# Patient Record
Sex: MALE | Race: WHITE | NOT HISPANIC OR LATINO | Employment: UNEMPLOYED | URBAN - METROPOLITAN AREA
[De-identification: names, ages, dates, MRNs, and addresses within clinical notes are randomized per-mention and may not be internally consistent; named-entity substitution may affect disease eponyms.]

---

## 2017-02-01 ENCOUNTER — OFFICE VISIT (OUTPATIENT)
Dept: INTERNAL MEDICINE | Facility: CLINIC | Age: 47
End: 2017-02-01

## 2017-02-01 VITALS
DIASTOLIC BLOOD PRESSURE: 80 MMHG | TEMPERATURE: 97.7 F | OXYGEN SATURATION: 97 % | SYSTOLIC BLOOD PRESSURE: 118 MMHG | RESPIRATION RATE: 16 BRPM | HEART RATE: 58 BPM | WEIGHT: 126 LBS | BODY MASS INDEX: 20.97 KG/M2

## 2017-02-01 DIAGNOSIS — M19.90 ARTHRITIS: ICD-10-CM

## 2017-02-01 DIAGNOSIS — R53.83 FATIGUE, UNSPECIFIED TYPE: ICD-10-CM

## 2017-02-01 DIAGNOSIS — F41.9 ANXIETY: Primary | ICD-10-CM

## 2017-02-01 DIAGNOSIS — K21.9 GASTROESOPHAGEAL REFLUX DISEASE, ESOPHAGITIS PRESENCE NOT SPECIFIED: ICD-10-CM

## 2017-02-01 DIAGNOSIS — N40.1 BENIGN PROSTATIC HYPERPLASIA WITH LOWER URINARY TRACT SYMPTOMS, UNSPECIFIED MORPHOLOGY: ICD-10-CM

## 2017-02-01 PROBLEM — M79.673 FOOT PAIN: Status: ACTIVE | Noted: 2017-02-01

## 2017-02-01 LAB
PSA SERPL-MCNC: 1 NG/ML (ref 0–4)
T4 FREE SERPL-MCNC: 1.11 NG/DL (ref 0.89–1.76)
TSH SERPL DL<=0.05 MIU/L-ACNC: 1.9 MIU/ML (ref 0.35–5.35)

## 2017-02-01 PROCEDURE — 36415 COLL VENOUS BLD VENIPUNCTURE: CPT | Performed by: PHYSICIAN ASSISTANT

## 2017-02-01 PROCEDURE — 84443 ASSAY THYROID STIM HORMONE: CPT | Performed by: PHYSICIAN ASSISTANT

## 2017-02-01 PROCEDURE — G0103 PSA SCREENING: HCPCS | Performed by: PHYSICIAN ASSISTANT

## 2017-02-01 PROCEDURE — 99214 OFFICE O/P EST MOD 30 MIN: CPT | Performed by: PHYSICIAN ASSISTANT

## 2017-02-01 PROCEDURE — 84439 ASSAY OF FREE THYROXINE: CPT | Performed by: PHYSICIAN ASSISTANT

## 2017-02-01 RX ORDER — TAMSULOSIN HYDROCHLORIDE 0.4 MG/1
0.4 CAPSULE ORAL DAILY
Status: DISCONTINUED | OUTPATIENT
Start: 2017-02-01 | End: 2020-10-14

## 2017-02-01 RX ORDER — MELOXICAM 15 MG/1
15 TABLET ORAL DAILY
Qty: 30 TABLET | Refills: 5 | Status: ON HOLD | OUTPATIENT
Start: 2017-02-01 | End: 2020-10-14

## 2017-02-01 RX ORDER — OMEPRAZOLE 20 MG/1
20 CAPSULE, DELAYED RELEASE ORAL DAILY
Qty: 30 CAPSULE | Refills: 5 | Status: ON HOLD | OUTPATIENT
Start: 2017-02-01 | End: 2020-10-14

## 2017-02-01 RX ORDER — DICYCLOMINE HCL 20 MG
TABLET ORAL
Status: ON HOLD | COMMUNITY
Start: 2017-01-30 | End: 2020-10-14

## 2017-02-01 RX ORDER — HYDROXYZINE 50 MG/1
50 TABLET, FILM COATED ORAL 3 TIMES DAILY PRN
Qty: 90 TABLET | Refills: 2 | Status: ON HOLD | OUTPATIENT
Start: 2017-02-01 | End: 2020-10-14

## 2017-02-01 NOTE — PROGRESS NOTES
Subjective   David Dempsey is a 46 y.o. male.   Chief Complaint   Patient presents with   • Abdominal Pain     f/u   • Dizziness     History of Present Illness   PT went to [facility] for Epigastric pain, burning sensation, nausea. CT scan showed prominent 7mm common bile duct, otherwise normal scan.  He was given bentyl. This started after he wasn't on the omeprazole.  Hasn't had hydroxyzine for several weeks.      HE complains of 2 months of decreased urine flow, urine frequency, and feeling like he doesn't completely void nocturia 2-3 times nightly.    Pt is under a lot of stress, not sleeping.  Pt has a lot joint pains.  He is christiano.  GIrlfriend of 17 years has left him for his best friend.  MOther recently moved in with him, she has dementia.    The following portions of the patient's history were reviewed and updated as appropriate: allergies, current medications and problem list.    Review of Systems   Constitutional: Positive for appetite change, fatigue and unexpected weight change.   Gastrointestinal: Positive for abdominal pain (epigastric). Negative for constipation and diarrhea.   Endocrine: Positive for polyuria (x 2 months.  Nocturia 2-3 times.).   Genitourinary: Positive for frequency.   Psychiatric/Behavioral: Positive for sleep disturbance.       Objective   Physical Exam   Constitutional: He appears well-developed and well-nourished.   HENT:   Head: Normocephalic and atraumatic.   Right Ear: External ear normal.   Left Ear: External ear normal.   Eyes: Conjunctivae are normal.   Cardiovascular: Normal rate, regular rhythm and normal heart sounds.  Exam reveals no gallop and no friction rub.    No murmur heard.  Pulmonary/Chest: Effort normal and breath sounds normal.   Abdominal: Soft. Bowel sounds are normal. He exhibits no distension. There is generalized tenderness. There is no CVA tenderness. No hernia.   Genitourinary: Prostate is enlarged and tender.   Psychiatric: He has a normal mood  and affect.   Vitals reviewed.      Assessment/Plan   David was seen today for abdominal pain and dizziness.    Diagnoses and all orders for this visit:    Anxiety  -     hydrOXYzine (ATARAX) 50 MG tablet; Take 1 tablet by mouth 3 (Three) Times a Day As Needed for anxiety.    Gastroesophageal reflux disease, esophagitis presence not specified  -     omeprazole (priLOSEC) 20 MG capsule; Take 1 capsule by mouth Daily.    Benign prostatic hyperplasia with lower urinary tract symptoms, unspecified morphology  -     tamsulosin (FLOMAX) 24 hr capsule 0.4 mg; Take 1 capsule by mouth Daily.  -     PSA Screen    Arthritis  -     meloxicam (MOBIC) 15 MG tablet; Take 1 tablet by mouth Daily.    Fatigue, unspecified type  -     TSH  -     T4, Free      ED note reviewed including labs and CT scan report.

## 2020-10-11 ENCOUNTER — HOSPITAL ENCOUNTER (INPATIENT)
Facility: HOSPITAL | Age: 50
LOS: 10 days | Discharge: HOME | End: 2020-10-21
Attending: NEUROLOGICAL SURGERY | Admitting: INTERNAL MEDICINE

## 2020-10-11 ENCOUNTER — APPOINTMENT (OUTPATIENT)
Dept: CT IMAGING | Facility: HOSPITAL | Age: 50
End: 2020-10-11

## 2020-10-11 DIAGNOSIS — I60.9 SAH (SUBARACHNOID HEMORRHAGE) (HCC): ICD-10-CM

## 2020-10-11 DIAGNOSIS — I60.9 SAH (SUBARACHNOID HEMORRHAGE) (HCC): Primary | ICD-10-CM

## 2020-10-11 PROBLEM — Z86.79 HISTORY OF SUBDURAL HEMATOMA: Status: ACTIVE | Noted: 2020-10-11

## 2020-10-11 PROBLEM — I10 HTN (HYPERTENSION): Status: ACTIVE | Noted: 2020-10-11

## 2020-10-11 PROBLEM — F12.90 MARIJUANA USE: Status: ACTIVE | Noted: 2020-10-11

## 2020-10-11 PROBLEM — Z72.0 TOBACCO USE: Status: ACTIVE | Noted: 2020-10-11

## 2020-10-11 LAB
ALBUMIN SERPL-MCNC: 4.2 G/DL (ref 3.5–5.2)
ALBUMIN/GLOB SERPL: 1.5 G/DL
ALP SERPL-CCNC: 78 U/L (ref 39–117)
ALT SERPL W P-5'-P-CCNC: 13 U/L (ref 1–41)
AMPHET+METHAMPHET UR QL: NEGATIVE
AMPHETAMINES UR QL: NEGATIVE
ANION GAP SERPL CALCULATED.3IONS-SCNC: 7 MMOL/L (ref 5–15)
AST SERPL-CCNC: 21 U/L (ref 1–40)
BARBITURATES UR QL SCN: NEGATIVE
BASOPHILS # BLD AUTO: 0.02 10*3/MM3 (ref 0–0.2)
BASOPHILS NFR BLD AUTO: 0.2 % (ref 0–1.5)
BENZODIAZ UR QL SCN: NEGATIVE
BILIRUB SERPL-MCNC: 0.3 MG/DL (ref 0–1.2)
BUN SERPL-MCNC: 8 MG/DL (ref 6–20)
BUN/CREAT SERPL: 10.1 (ref 7–25)
BUPRENORPHINE SERPL-MCNC: NEGATIVE NG/ML
CALCIUM SPEC-SCNC: 8.4 MG/DL (ref 8.6–10.5)
CANNABINOIDS SERPL QL: POSITIVE
CHLORIDE SERPL-SCNC: 105 MMOL/L (ref 98–107)
CO2 SERPL-SCNC: 24 MMOL/L (ref 22–29)
COCAINE UR QL: NEGATIVE
CREAT SERPL-MCNC: 0.79 MG/DL (ref 0.76–1.27)
DEPRECATED RDW RBC AUTO: 47.8 FL (ref 37–54)
EOSINOPHIL # BLD AUTO: 0.05 10*3/MM3 (ref 0–0.4)
EOSINOPHIL NFR BLD AUTO: 0.4 % (ref 0.3–6.2)
ERYTHROCYTE [DISTWIDTH] IN BLOOD BY AUTOMATED COUNT: 13.6 % (ref 12.3–15.4)
GFR SERPL CREATININE-BSD FRML MDRD: 103 ML/MIN/1.73
GLOBULIN UR ELPH-MCNC: 2.8 GM/DL
GLUCOSE BLDC GLUCOMTR-MCNC: 116 MG/DL (ref 70–130)
GLUCOSE BLDC GLUCOMTR-MCNC: 134 MG/DL (ref 70–130)
GLUCOSE SERPL-MCNC: 133 MG/DL (ref 65–99)
HCT VFR BLD AUTO: 48 % (ref 37.5–51)
HGB BLD-MCNC: 15.5 G/DL (ref 13–17.7)
IMM GRANULOCYTES # BLD AUTO: 0.05 10*3/MM3 (ref 0–0.05)
IMM GRANULOCYTES NFR BLD AUTO: 0.4 % (ref 0–0.5)
INR PPP: 1 (ref 0.85–1.16)
LYMPHOCYTES # BLD AUTO: 0.66 10*3/MM3 (ref 0.7–3.1)
LYMPHOCYTES NFR BLD AUTO: 5.9 % (ref 19.6–45.3)
MAGNESIUM SERPL-MCNC: 1.8 MG/DL (ref 1.6–2.6)
MCH RBC QN AUTO: 30.6 PG (ref 26.6–33)
MCHC RBC AUTO-ENTMCNC: 32.3 G/DL (ref 31.5–35.7)
MCV RBC AUTO: 94.7 FL (ref 79–97)
METHADONE UR QL SCN: NEGATIVE
MONOCYTES # BLD AUTO: 0.29 10*3/MM3 (ref 0.1–0.9)
MONOCYTES NFR BLD AUTO: 2.6 % (ref 5–12)
NEUTROPHILS NFR BLD AUTO: 10.17 10*3/MM3 (ref 1.7–7)
NEUTROPHILS NFR BLD AUTO: 90.5 % (ref 42.7–76)
NRBC BLD AUTO-RTO: 0 /100 WBC (ref 0–0.2)
OPIATES UR QL: POSITIVE
OXYCODONE UR QL SCN: NEGATIVE
PCP UR QL SCN: NEGATIVE
PHOSPHATE SERPL-MCNC: 2.8 MG/DL (ref 2.5–4.5)
PLATELET # BLD AUTO: 238 10*3/MM3 (ref 140–450)
PMV BLD AUTO: 9.9 FL (ref 6–12)
POTASSIUM SERPL-SCNC: 4.4 MMOL/L (ref 3.5–5.2)
PROPOXYPH UR QL: NEGATIVE
PROT SERPL-MCNC: 7 G/DL (ref 6–8.5)
PROTHROMBIN TIME: 12.9 SECONDS (ref 11.5–14)
RBC # BLD AUTO: 5.07 10*6/MM3 (ref 4.14–5.8)
SARS-COV-2 RNA RESP QL NAA+PROBE: NOT DETECTED
SODIUM SERPL-SCNC: 136 MMOL/L (ref 136–145)
TRICYCLICS UR QL SCN: NEGATIVE
WBC # BLD AUTO: 11.24 10*3/MM3 (ref 3.4–10.8)

## 2020-10-11 PROCEDURE — 25010000003 MAGNESIUM SULFATE 4 GM/100ML SOLUTION: Performed by: INTERNAL MEDICINE

## 2020-10-11 PROCEDURE — 93010 ELECTROCARDIOGRAM REPORT: CPT | Performed by: INTERNAL MEDICINE

## 2020-10-11 PROCEDURE — 93005 ELECTROCARDIOGRAM TRACING: CPT | Performed by: INTERNAL MEDICINE

## 2020-10-11 PROCEDURE — 25010000002 HYDROMORPHONE PER 4 MG: Performed by: INTERNAL MEDICINE

## 2020-10-11 PROCEDURE — 85025 COMPLETE CBC W/AUTO DIFF WBC: CPT | Performed by: INTERNAL MEDICINE

## 2020-10-11 PROCEDURE — 87635 SARS-COV-2 COVID-19 AMP PRB: CPT | Performed by: NURSE PRACTITIONER

## 2020-10-11 PROCEDURE — 85610 PROTHROMBIN TIME: CPT | Performed by: NURSE PRACTITIONER

## 2020-10-11 PROCEDURE — 84100 ASSAY OF PHOSPHORUS: CPT | Performed by: INTERNAL MEDICINE

## 2020-10-11 PROCEDURE — 83735 ASSAY OF MAGNESIUM: CPT | Performed by: INTERNAL MEDICINE

## 2020-10-11 PROCEDURE — 80306 DRUG TEST PRSMV INSTRMNT: CPT | Performed by: NURSE PRACTITIONER

## 2020-10-11 PROCEDURE — 70498 CT ANGIOGRAPHY NECK: CPT

## 2020-10-11 PROCEDURE — 25010000002 KETOROLAC TROMETHAMINE PER 15 MG: Performed by: NURSE PRACTITIONER

## 2020-10-11 PROCEDURE — 80053 COMPREHEN METABOLIC PANEL: CPT | Performed by: INTERNAL MEDICINE

## 2020-10-11 PROCEDURE — 70496 CT ANGIOGRAPHY HEAD: CPT

## 2020-10-11 PROCEDURE — 82962 GLUCOSE BLOOD TEST: CPT

## 2020-10-11 PROCEDURE — 99291 CRITICAL CARE FIRST HOUR: CPT | Performed by: INTERNAL MEDICINE

## 2020-10-11 PROCEDURE — 0 IOPAMIDOL PER 1 ML: Performed by: NEUROLOGICAL SURGERY

## 2020-10-11 PROCEDURE — 70450 CT HEAD/BRAIN W/O DYE: CPT

## 2020-10-11 RX ORDER — HYDROMORPHONE HYDROCHLORIDE 1 MG/ML
0.5 INJECTION, SOLUTION INTRAMUSCULAR; INTRAVENOUS; SUBCUTANEOUS
Status: DISCONTINUED | OUTPATIENT
Start: 2020-10-11 | End: 2020-10-20

## 2020-10-11 RX ORDER — NICOTINE 21 MG/24HR
1 PATCH, TRANSDERMAL 24 HOURS TRANSDERMAL EVERY 24 HOURS
Status: DISCONTINUED | OUTPATIENT
Start: 2020-10-11 | End: 2020-10-11

## 2020-10-11 RX ORDER — KETOROLAC TROMETHAMINE 30 MG/ML
30 INJECTION, SOLUTION INTRAMUSCULAR; INTRAVENOUS ONCE
Status: COMPLETED | OUTPATIENT
Start: 2020-10-11 | End: 2020-10-11

## 2020-10-11 RX ORDER — FAMOTIDINE 10 MG/ML
20 INJECTION, SOLUTION INTRAVENOUS EVERY 12 HOURS SCHEDULED
Status: DISCONTINUED | OUTPATIENT
Start: 2020-10-11 | End: 2020-10-12

## 2020-10-11 RX ORDER — POTASSIUM CHLORIDE 750 MG/1
40 CAPSULE, EXTENDED RELEASE ORAL AS NEEDED
Status: DISCONTINUED | OUTPATIENT
Start: 2020-10-11 | End: 2020-10-21 | Stop reason: HOSPADM

## 2020-10-11 RX ORDER — POTASSIUM CHLORIDE 1.5 G/1.77G
40 POWDER, FOR SOLUTION ORAL AS NEEDED
Status: DISCONTINUED | OUTPATIENT
Start: 2020-10-11 | End: 2020-10-21 | Stop reason: HOSPADM

## 2020-10-11 RX ORDER — SODIUM CHLORIDE 9 MG/ML
50 INJECTION, SOLUTION INTRAVENOUS CONTINUOUS
Status: DISCONTINUED | OUTPATIENT
Start: 2020-10-11 | End: 2020-10-15

## 2020-10-11 RX ORDER — MAGNESIUM SULFATE HEPTAHYDRATE 40 MG/ML
2 INJECTION, SOLUTION INTRAVENOUS AS NEEDED
Status: DISCONTINUED | OUTPATIENT
Start: 2020-10-11 | End: 2020-10-21 | Stop reason: HOSPADM

## 2020-10-11 RX ORDER — ACETAMINOPHEN 500 MG
1000 TABLET ORAL EVERY 6 HOURS PRN
Status: DISCONTINUED | OUTPATIENT
Start: 2020-10-11 | End: 2020-10-21 | Stop reason: HOSPADM

## 2020-10-11 RX ORDER — HYDROCODONE BITARTRATE AND ACETAMINOPHEN 5; 325 MG/1; MG/1
1 TABLET ORAL EVERY 8 HOURS PRN
Status: DISCONTINUED | OUTPATIENT
Start: 2020-10-11 | End: 2020-10-11

## 2020-10-11 RX ORDER — DEXTROSE MONOHYDRATE 25 G/50ML
25 INJECTION, SOLUTION INTRAVENOUS
Status: DISCONTINUED | OUTPATIENT
Start: 2020-10-11 | End: 2020-10-21 | Stop reason: HOSPADM

## 2020-10-11 RX ORDER — SODIUM CHLORIDE 0.9 % (FLUSH) 0.9 %
10 SYRINGE (ML) INJECTION AS NEEDED
Status: DISCONTINUED | OUTPATIENT
Start: 2020-10-11 | End: 2020-10-13 | Stop reason: SDUPTHER

## 2020-10-11 RX ORDER — POTASSIUM CHLORIDE 7.45 MG/ML
10 INJECTION INTRAVENOUS
Status: DISCONTINUED | OUTPATIENT
Start: 2020-10-11 | End: 2020-10-21 | Stop reason: HOSPADM

## 2020-10-11 RX ORDER — SODIUM CHLORIDE 0.9 % (FLUSH) 0.9 %
10 SYRINGE (ML) INJECTION EVERY 12 HOURS SCHEDULED
Status: DISCONTINUED | OUTPATIENT
Start: 2020-10-11 | End: 2020-10-15

## 2020-10-11 RX ORDER — MAGNESIUM SULFATE HEPTAHYDRATE 40 MG/ML
4 INJECTION, SOLUTION INTRAVENOUS AS NEEDED
Status: DISCONTINUED | OUTPATIENT
Start: 2020-10-11 | End: 2020-10-21 | Stop reason: HOSPADM

## 2020-10-11 RX ORDER — NICOTINE POLACRILEX 4 MG
15 LOZENGE BUCCAL
Status: DISCONTINUED | OUTPATIENT
Start: 2020-10-11 | End: 2020-10-21 | Stop reason: HOSPADM

## 2020-10-11 RX ORDER — AMOXICILLIN 250 MG
2 CAPSULE ORAL 2 TIMES DAILY
Status: DISCONTINUED | OUTPATIENT
Start: 2020-10-11 | End: 2020-10-17

## 2020-10-11 RX ORDER — HYDROCODONE BITARTRATE AND ACETAMINOPHEN 5; 325 MG/1; MG/1
1 TABLET ORAL EVERY 6 HOURS PRN
Status: DISCONTINUED | OUTPATIENT
Start: 2020-10-11 | End: 2020-10-20

## 2020-10-11 RX ORDER — TAMSULOSIN HYDROCHLORIDE 0.4 MG/1
0.4 CAPSULE ORAL DAILY
Status: DISCONTINUED | OUTPATIENT
Start: 2020-10-11 | End: 2020-10-21 | Stop reason: HOSPADM

## 2020-10-11 RX ORDER — NIMODIPINE 30 MG/1
60 CAPSULE, LIQUID FILLED ORAL
Status: DISCONTINUED | OUTPATIENT
Start: 2020-10-11 | End: 2020-10-21 | Stop reason: HOSPADM

## 2020-10-11 RX ADMIN — MAGNESIUM SULFATE HEPTAHYDRATE 4 G: 40 INJECTION, SOLUTION INTRAVENOUS at 20:49

## 2020-10-11 RX ADMIN — NICARDIPINE HYDROCHLORIDE 5 MG/HR: 0.1 INJECTION, SOLUTION INTRAVENOUS at 20:48

## 2020-10-11 RX ADMIN — HYDROCODONE BITARTRATE AND ACETAMINOPHEN 1 TABLET: 5; 325 TABLET ORAL at 20:18

## 2020-10-11 RX ADMIN — NICARDIPINE HYDROCHLORIDE 5 MG/HR: 0.1 INJECTION, SOLUTION INTRAVENOUS at 16:44

## 2020-10-11 RX ADMIN — HYDROMORPHONE HYDROCHLORIDE 0.5 MG: 1 INJECTION, SOLUTION INTRAMUSCULAR; INTRAVENOUS; SUBCUTANEOUS at 21:04

## 2020-10-11 RX ADMIN — HYDROMORPHONE HYDROCHLORIDE 0.5 MG: 1 INJECTION, SOLUTION INTRAMUSCULAR; INTRAVENOUS; SUBCUTANEOUS at 23:20

## 2020-10-11 RX ADMIN — KETOROLAC TROMETHAMINE 30 MG: 30 INJECTION, SOLUTION INTRAMUSCULAR at 16:44

## 2020-10-11 RX ADMIN — SODIUM CHLORIDE 100 ML/HR: 9 INJECTION, SOLUTION INTRAVENOUS at 18:23

## 2020-10-11 RX ADMIN — FAMOTIDINE 20 MG: 10 INJECTION INTRAVENOUS at 20:17

## 2020-10-11 RX ADMIN — IOPAMIDOL 75 ML: 755 INJECTION, SOLUTION INTRAVENOUS at 14:35

## 2020-10-11 RX ADMIN — NIMODIPINE 60 MG: 30 CAPSULE, LIQUID FILLED ORAL at 20:17

## 2020-10-11 RX ADMIN — TAMSULOSIN HYDROCHLORIDE 0.4 MG: 0.4 CAPSULE ORAL at 18:22

## 2020-10-11 RX ADMIN — NIMODIPINE 60 MG: 30 CAPSULE, LIQUID FILLED ORAL at 18:22

## 2020-10-11 RX ADMIN — SENNOSIDES AND DOCUSATE SODIUM 2 TABLET: 8.6; 5 TABLET ORAL at 20:21

## 2020-10-11 NOTE — ACP (ADVANCE CARE PLANNING)
I visited with patient regarding referral for an Advanced Care Plan. Patient's SO stated he had one at home. It was completed a year ago.

## 2020-10-11 NOTE — PROGRESS NOTES
Received phone call requesting transfer from [facility] ED to [facility] for management of subarachnoid hemorrhage likely secondary to aneurysm rupture.  Spoke with ACC and house supervisor to facilitate this transfer.  Patient arrived at 1424 and taken immediately to CT scanner.  CT head without contrast and CTA head neck were obtained.  Dr. Iraheta reviewed all imaging and gave the following orders:   -Admit to neuro ICU  -Hemorrhagic stroke order set  -Strict blood pressure management SBP<140, Cardene drip order placed to achieve this  -Plan for angio in a.m.--order to obtain sent and n.p.o. after midnight placed  -Medication orders placed for headache per Dr. Iraheta  -Please page neurosurgery with any neurologic changes  -[employer] is a 2 for moderate to severe headache

## 2020-10-11 NOTE — NURSING NOTE
ACC REVIEW REPORT: [facility]        PATIENT NAME: David Dempsey    PATIENT ID: [id number]      COVID-19 ACC SCREENING       DOES THE PATIENT HAVE A FEVER GREATER THAN OR EQUAL .4: NO    IS THE PATIENT EXPERIENCING SHORTNESS OF BREATH: NO    DOES THE PATIENT HAVE A COUGH: NO    DOES THE PATIENT HAVE ANY OF THE FOLLOWING RISK FACTORS:    EXPOSURE TO SUSPECTED OR KNOWN COVID-19: NO    RECENT TRAVEL HISTORY TO ENDEMIC AREA (DOMESTIC/LOCAL): NO    IS THE PATIENT A HEALTHCARE WORKER: NO    HAS THE PATIENT BEEN TESTED FOR COVID-19: NO    DATE TESTED: NOT TESTED    LAB TESTING SENT TO: NOT TESTED      BED: N239    BED TYPE: ICU    BED GIVEN TO:     TIME BED GIVEN: 1410    YOB: 1970    AGE: 51YO    GENDER: M    PREVIOUS ADMIT TO [facility]:     PREVIOUS ADMISSION DATE:     PATIENT CLASS: INPATIENT    TODAY'S DATE: 10/11/2020    TRANSFER DATE: 10/11/2020    ETA: 1415    TRANSFERRING FACILITY: [facility]    TRANSFERRING FACILITY PHONE # : [phone]    TRANSFERRING MD:     ACCEPTING PROVIDER: MD RIVERA    NEUROLOGY PHYSICIAN: MIGUEL    DATE/TIME REQUEST RECEIVED: 10/11 1350    [facility] RN: SAULO THOMAS    REPORT FROM: SAULO HARO    TIME REPORT TAKEN: 1400    DIAGNOSIS: STROKE    REASON FOR TRANSFER TO [facility]: HIGHER LEVEL OF CARE    TRANSPORTATION: AMBULANCE    CLINICAL REASON FOR TRANSFER TO [facility]: PATIENT ARRIVED TO ED WITH COMPLAINTS OF NAUSEA, VOMITING, INTENSE HEADACHE AT 1000.  BP WAS IN THE 170S ON ARRIVAL, IMAGING SHOWED FINDINGS CONSISTENT WITH SUBARACHNOID HEMORRHAGE, PATIENT TRANSFERRING TO RECEIVE INTERVENTIONS.      CLINICAL INFORMATION    HEIGHT: 5'5    WEIGHT: 132LBS    ALLERGIES: PENICILLIN    YO: NONE    INFECTIOUS DISEASE: NONE    ISOLATION: NONE    LAST VITAL SIGNS:  TIME: 1400  TEMP: 97.9F  PULSE: 60  B/P: 139/81  RESP: 19    LAB INFORMATION: WBC 16.5, HGB 15.3, HCT 46.2, BLOOD GLUCOSE 119, , K 3.7, CREATININE 1.1, BUN 11    CULTURE INFORMATION: NONE    MEDS/IV FLUIDS: 18' LEFT  AC, 18' LEFT HAND  CARDENE DRIP AT 5mg/hr FOR BP CONTROL (MOST LIKELY WILL RUN OUT BEFORE ARRIVING TO [facility])      CARDIAC SYSTEM:    CHEST PAIN: NONE    RATE: NONE    SCALE: NONE    RHYTHM: SINUS BRADYCARDIA TO NORMAL SINUS RHYTHM    Is patient taking or has patient been given any drugs that could increase bleeding? DENIES  (Plavix, Brilinta, Effient, Eliquis, Xarelto, Warfarin, Integrilin, Angiomax)    DRUG:      DOSE/FREQUENCY:     CARDIAC NOTES: EKG SHOWED SINUS BRADYCARDIA ON ADMISSION, CURRENTLY NORMAL SINUS RHYTHM.       RESPIRATORY SYSTEM:    LUNG SOUNDS: PRESENT    CLEAR: YES    OXYGEN: 93%    O2 SAT: ROOM AIR    RESPIRATORY STATUS:       CNS/MUSCULOSKELETAL      ARCENIO COMA SCALE: PERRUDDY    E: 4  M: 6  V: 5    LAST KNOWN WELL: 10/11 1400          NIHSS    Survey Item  0: Means Alert  1: Drowsy or Answer Correctly  2: Incorrect, Forced, Can't Resist Gravity  3: Complete or No Effort  4: No Movement  NT: Not Testable Acceptable As Noted Above      1A: Level of Consciousness: 0    1B: LOC Questions (month, age) : 0    1C: LOC Commands (open/close eyes, make a fist & let go): 0    2:  Best Gaze (eyes open-pt follows examiner's fingers or face): 0    3:  Visual (introduce visual stimulus/threat to pt's visual field quad. Cover 1 eye and hold up fingers in all 4 quadrants) : 0    4.  Facial Palsy (show teeth, raise eyebrows and squeeze eyes tightly shut): 0    5A: Motor Arm-Left (elevate extremity to 90 degrees and score drift/movement.  Count to 10 aloud and use fingers for visual cue): 0    5B:  Motor Arm-Right (elevate extremity to 90 degrees and score drift/movement.  Count to 10 aloud and use fingers for visual cue): 0    6A:  Motor Leg-Left (elevate extremity to 30 degrees and score drift/movement.  Count to 5 out loud and use fingers for visual cue): 0    6B:  Motor Leg-Right (elevate extremity to 30 degrees and score drift/movement.  Count to 5 out loud and use fingers for visual cue): 0    7:  Limb  Ataxia- finger to nose, heel down shin: 0    8:  Sensory- pin prick to face, arms, trunk, and legs. Compare sharpness side to side: 0    9:  Best Language- name, items, describe picture, and read sentences.  Do not forget glasses if they normally wear them: 0    10: Dysarthria- elevate speech clarity by pt reading or repeating words on a list: 0    11: Extinction and Inattention- Use information from prior testing or double simultaneous stimuli testing to identify neglect. Face, arms, legs and visual field: 0    Total NIHSS Score: 0  Date: 10/11  Time of NIHSS Assessment:         SIZE OF HEMORRHAGE: LARGE AMOUNT OF FLUID CONSISTENT WITH SUBARACHNOID HEMORRHAGE, CONSISTENT WITH RUPTURE OF CEREBRAL ANEURYSM    CAT SCAN RESULTS:     MRI RESULTS:     CNS/MUSCULOSKELETAL NOTES: PATIENT WITH HISTORY OF CEREBRAL ANEURYSM, GIVEN 4MG OF MORPHINE TWICE FOR PAIN RELATED TO HEADACHE.      GI//GY      ABDOMINAL PAIN: NONE    VOMITING: NOT CURRENTLY     DIARRHEA: NONE    NAUSEA: NOT CURRENTLY    BOWEL SOUNDS: PRESENT    OCCULT STOOL:     TESTICULAR PAIN:     HEMATURIA:     GI//GY NOTES: PT HAS HAD NO URINE OUTPUT SINCE PRESENTING TO [facility] ED.     PAST MEDICAL HISTORY: GERD, ARTHRITIS, SMOKING    OTHER SYMPTOM NOTES:     ADDITIONAL NOTES:           Jasmina Webber RN  10/11/2020  14:12 EDT
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
## 2020-10-12 ENCOUNTER — APPOINTMENT (OUTPATIENT)
Dept: CARDIOLOGY | Facility: HOSPITAL | Age: 50
End: 2020-10-12

## 2020-10-12 ENCOUNTER — APPOINTMENT (OUTPATIENT)
Dept: GENERAL RADIOLOGY | Facility: HOSPITAL | Age: 50
End: 2020-10-12

## 2020-10-12 LAB
ALBUMIN SERPL-MCNC: 3.6 G/DL (ref 3.5–5.2)
ALBUMIN/GLOB SERPL: 1.4 G/DL
ALP SERPL-CCNC: 74 U/L (ref 39–117)
ALT SERPL W P-5'-P-CCNC: 10 U/L (ref 1–41)
ANION GAP SERPL CALCULATED.3IONS-SCNC: 9 MMOL/L (ref 5–15)
AST SERPL-CCNC: 17 U/L (ref 1–40)
BASOPHILS # BLD AUTO: 0.03 10*3/MM3 (ref 0–0.2)
BASOPHILS NFR BLD AUTO: 0.3 % (ref 0–1.5)
BILIRUB SERPL-MCNC: 0.4 MG/DL (ref 0–1.2)
BUN SERPL-MCNC: 9 MG/DL (ref 6–20)
BUN/CREAT SERPL: 11 (ref 7–25)
CALCIUM SPEC-SCNC: 8 MG/DL (ref 8.6–10.5)
CHLORIDE SERPL-SCNC: 104 MMOL/L (ref 98–107)
CHOLEST SERPL-MCNC: 148 MG/DL (ref 0–200)
CO2 SERPL-SCNC: 23 MMOL/L (ref 22–29)
CREAT SERPL-MCNC: 0.82 MG/DL (ref 0.76–1.27)
DEPRECATED RDW RBC AUTO: 49.2 FL (ref 37–54)
EOSINOPHIL # BLD AUTO: 0.5 10*3/MM3 (ref 0–0.4)
EOSINOPHIL NFR BLD AUTO: 4.6 % (ref 0.3–6.2)
ERYTHROCYTE [DISTWIDTH] IN BLOOD BY AUTOMATED COUNT: 13.8 % (ref 12.3–15.4)
GFR SERPL CREATININE-BSD FRML MDRD: 99 ML/MIN/1.73
GLOBULIN UR ELPH-MCNC: 2.5 GM/DL
GLUCOSE BLDC GLUCOMTR-MCNC: 112 MG/DL (ref 70–130)
GLUCOSE BLDC GLUCOMTR-MCNC: 118 MG/DL (ref 70–130)
GLUCOSE SERPL-MCNC: 116 MG/DL (ref 65–99)
HBA1C MFR BLD: 5.5 % (ref 4.8–5.6)
HCT VFR BLD AUTO: 44.3 % (ref 37.5–51)
HDLC SERPL-MCNC: 36 MG/DL (ref 40–60)
HGB BLD-MCNC: 14.4 G/DL (ref 13–17.7)
IMM GRANULOCYTES # BLD AUTO: 0.06 10*3/MM3 (ref 0–0.05)
IMM GRANULOCYTES NFR BLD AUTO: 0.6 % (ref 0–0.5)
LDLC SERPL CALC-MCNC: 98 MG/DL (ref 0–100)
LDLC/HDLC SERPL: 2.71 {RATIO}
LYMPHOCYTES # BLD AUTO: 1.33 10*3/MM3 (ref 0.7–3.1)
LYMPHOCYTES NFR BLD AUTO: 12.2 % (ref 19.6–45.3)
MAGNESIUM SERPL-MCNC: 2.7 MG/DL (ref 1.6–2.6)
MCH RBC QN AUTO: 31.5 PG (ref 26.6–33)
MCHC RBC AUTO-ENTMCNC: 32.5 G/DL (ref 31.5–35.7)
MCV RBC AUTO: 96.9 FL (ref 79–97)
MONOCYTES # BLD AUTO: 0.8 10*3/MM3 (ref 0.1–0.9)
MONOCYTES NFR BLD AUTO: 7.3 % (ref 5–12)
NEUTROPHILS NFR BLD AUTO: 75 % (ref 42.7–76)
NEUTROPHILS NFR BLD AUTO: 8.17 10*3/MM3 (ref 1.7–7)
NRBC BLD AUTO-RTO: 0 /100 WBC (ref 0–0.2)
PHOSPHATE SERPL-MCNC: 2.6 MG/DL (ref 2.5–4.5)
PLATELET # BLD AUTO: 233 10*3/MM3 (ref 140–450)
PMV BLD AUTO: 9.7 FL (ref 6–12)
POTASSIUM SERPL-SCNC: 3.9 MMOL/L (ref 3.5–5.2)
PROT SERPL-MCNC: 6.1 G/DL (ref 6–8.5)
RBC # BLD AUTO: 4.57 10*6/MM3 (ref 4.14–5.8)
SODIUM SERPL-SCNC: 136 MMOL/L (ref 136–145)
TRIGL SERPL-MCNC: 72 MG/DL (ref 0–150)
VLDLC SERPL-MCNC: 14 MG/DL (ref 5–40)
WBC # BLD AUTO: 10.89 10*3/MM3 (ref 3.4–10.8)

## 2020-10-12 PROCEDURE — 36227 PLACE CATH XTRNL CAROTID: CPT | Performed by: RADIOLOGY

## 2020-10-12 PROCEDURE — 82962 GLUCOSE BLOOD TEST: CPT

## 2020-10-12 PROCEDURE — 99221 1ST HOSP IP/OBS SF/LOW 40: CPT | Performed by: PHYSICIAN ASSISTANT

## 2020-10-12 PROCEDURE — 25010000002 MIDAZOLAM PER 1 MG: Performed by: RADIOLOGY

## 2020-10-12 PROCEDURE — 36226 PLACE CATH VERTEBRAL ART: CPT | Performed by: RADIOLOGY

## 2020-10-12 PROCEDURE — B31CYZZ FLUOROSCOPY OF BILATERAL EXTERNAL CAROTID ARTERIES USING OTHER CONTRAST: ICD-10-PCS | Performed by: RADIOLOGY

## 2020-10-12 PROCEDURE — 25010000002 HYDROMORPHONE PER 4 MG: Performed by: PHYSICIAN ASSISTANT

## 2020-10-12 PROCEDURE — 25010000002 ONDANSETRON PER 1 MG: Performed by: PHYSICIAN ASSISTANT

## 2020-10-12 PROCEDURE — B31RYZZ FLUOROSCOPY OF INTRACRANIAL ARTERIES USING OTHER CONTRAST: ICD-10-PCS | Performed by: RADIOLOGY

## 2020-10-12 PROCEDURE — 25010000002 ONDANSETRON PER 1 MG: Performed by: NURSE PRACTITIONER

## 2020-10-12 PROCEDURE — 36224 PLACE CATH CAROTD ART: CPT | Performed by: RADIOLOGY

## 2020-10-12 PROCEDURE — 85025 COMPLETE CBC W/AUTO DIFF WBC: CPT | Performed by: INTERNAL MEDICINE

## 2020-10-12 PROCEDURE — 80053 COMPREHEN METABOLIC PANEL: CPT | Performed by: INTERNAL MEDICINE

## 2020-10-12 PROCEDURE — 92523 SPEECH SOUND LANG COMPREHEN: CPT | Performed by: SPEECH-LANGUAGE PATHOLOGIST

## 2020-10-12 PROCEDURE — B31GYZZ FLUOROSCOPY OF BILATERAL VERTEBRAL ARTERIES USING OTHER CONTRAST: ICD-10-PCS | Performed by: RADIOLOGY

## 2020-10-12 PROCEDURE — 93886 INTRACRANIAL COMPLETE STUDY: CPT

## 2020-10-12 PROCEDURE — 83735 ASSAY OF MAGNESIUM: CPT | Performed by: INTERNAL MEDICINE

## 2020-10-12 PROCEDURE — 25010000002 HYDROMORPHONE PER 4 MG: Performed by: INTERNAL MEDICINE

## 2020-10-12 PROCEDURE — 99232 SBSQ HOSP IP/OBS MODERATE 35: CPT | Performed by: INTERNAL MEDICINE

## 2020-10-12 PROCEDURE — 25010000002 FENTANYL CITRATE (PF) 100 MCG/2ML SOLUTION: Performed by: RADIOLOGY

## 2020-10-12 PROCEDURE — C1769 GUIDE WIRE: HCPCS | Performed by: RADIOLOGY

## 2020-10-12 PROCEDURE — 25010000002 PROMETHAZINE PER 50 MG: Performed by: PHYSICIAN ASSISTANT

## 2020-10-12 PROCEDURE — C1894 INTRO/SHEATH, NON-LASER: HCPCS | Performed by: RADIOLOGY

## 2020-10-12 PROCEDURE — C1760 CLOSURE DEV, VASC: HCPCS | Performed by: RADIOLOGY

## 2020-10-12 PROCEDURE — 83036 HEMOGLOBIN GLYCOSYLATED A1C: CPT | Performed by: INTERNAL MEDICINE

## 2020-10-12 PROCEDURE — 71045 X-RAY EXAM CHEST 1 VIEW: CPT

## 2020-10-12 PROCEDURE — 0 IODIXANOL PER 1 ML: Performed by: RADIOLOGY

## 2020-10-12 PROCEDURE — 25010000002 PROMETHAZINE PER 50 MG: Performed by: NEUROLOGICAL SURGERY

## 2020-10-12 PROCEDURE — 80061 LIPID PANEL: CPT | Performed by: NURSE PRACTITIONER

## 2020-10-12 PROCEDURE — 84100 ASSAY OF PHOSPHORUS: CPT | Performed by: INTERNAL MEDICINE

## 2020-10-12 RX ORDER — IODIXANOL 320 MG/ML
INJECTION, SOLUTION INTRAVASCULAR AS NEEDED
Status: DISCONTINUED | OUTPATIENT
Start: 2020-10-12 | End: 2020-10-12 | Stop reason: HOSPADM

## 2020-10-12 RX ORDER — MIDAZOLAM HYDROCHLORIDE 1 MG/ML
INJECTION INTRAMUSCULAR; INTRAVENOUS AS NEEDED
Status: DISCONTINUED | OUTPATIENT
Start: 2020-10-12 | End: 2020-10-12 | Stop reason: HOSPADM

## 2020-10-12 RX ORDER — SODIUM CHLORIDE 9 MG/ML
INJECTION, SOLUTION INTRAVENOUS CONTINUOUS PRN
Status: COMPLETED | OUTPATIENT
Start: 2020-10-12 | End: 2020-10-12

## 2020-10-12 RX ORDER — SODIUM CHLORIDE 0.9 % (FLUSH) 0.9 %
10 SYRINGE (ML) INJECTION AS NEEDED
Status: DISCONTINUED | OUTPATIENT
Start: 2020-10-12 | End: 2020-10-15

## 2020-10-12 RX ORDER — ONDANSETRON 2 MG/ML
4 INJECTION INTRAMUSCULAR; INTRAVENOUS EVERY 6 HOURS PRN
Status: DISCONTINUED | OUTPATIENT
Start: 2020-10-12 | End: 2020-10-21 | Stop reason: HOSPADM

## 2020-10-12 RX ORDER — SODIUM CHLORIDE 9 MG/ML
75 INJECTION, SOLUTION INTRAVENOUS CONTINUOUS
Status: ACTIVE | OUTPATIENT
Start: 2020-10-12 | End: 2020-10-12

## 2020-10-12 RX ORDER — FENTANYL CITRATE 50 UG/ML
INJECTION, SOLUTION INTRAMUSCULAR; INTRAVENOUS AS NEEDED
Status: DISCONTINUED | OUTPATIENT
Start: 2020-10-12 | End: 2020-10-12 | Stop reason: HOSPADM

## 2020-10-12 RX ORDER — LIDOCAINE HYDROCHLORIDE 10 MG/ML
INJECTION, SOLUTION EPIDURAL; INFILTRATION; INTRACAUDAL; PERINEURAL AS NEEDED
Status: DISCONTINUED | OUTPATIENT
Start: 2020-10-12 | End: 2020-10-12 | Stop reason: HOSPADM

## 2020-10-12 RX ORDER — SODIUM CHLORIDE 0.9 % (FLUSH) 0.9 %
3 SYRINGE (ML) INJECTION EVERY 12 HOURS SCHEDULED
Status: DISCONTINUED | OUTPATIENT
Start: 2020-10-12 | End: 2020-10-13

## 2020-10-12 RX ORDER — FAMOTIDINE 20 MG/1
20 TABLET, FILM COATED ORAL EVERY 12 HOURS SCHEDULED
Status: DISCONTINUED | OUTPATIENT
Start: 2020-10-12 | End: 2020-10-21 | Stop reason: HOSPADM

## 2020-10-12 RX ADMIN — HYDROMORPHONE HYDROCHLORIDE 0.5 MG: 1 INJECTION, SOLUTION INTRAMUSCULAR; INTRAVENOUS; SUBCUTANEOUS at 15:57

## 2020-10-12 RX ADMIN — HYDROMORPHONE HYDROCHLORIDE 0.5 MG: 1 INJECTION, SOLUTION INTRAMUSCULAR; INTRAVENOUS; SUBCUTANEOUS at 23:08

## 2020-10-12 RX ADMIN — FAMOTIDINE 20 MG: 10 INJECTION INTRAVENOUS at 08:00

## 2020-10-12 RX ADMIN — SODIUM CHLORIDE 100 ML/HR: 9 INJECTION, SOLUTION INTRAVENOUS at 15:57

## 2020-10-12 RX ADMIN — HYDROMORPHONE HYDROCHLORIDE 0.5 MG: 1 INJECTION, SOLUTION INTRAMUSCULAR; INTRAVENOUS; SUBCUTANEOUS at 08:11

## 2020-10-12 RX ADMIN — HYDROMORPHONE HYDROCHLORIDE 0.5 MG: 1 INJECTION, SOLUTION INTRAMUSCULAR; INTRAVENOUS; SUBCUTANEOUS at 02:51

## 2020-10-12 RX ADMIN — FAMOTIDINE 20 MG: 20 TABLET ORAL at 20:08

## 2020-10-12 RX ADMIN — NIMODIPINE 60 MG: 30 CAPSULE, LIQUID FILLED ORAL at 15:57

## 2020-10-12 RX ADMIN — NIMODIPINE 60 MG: 30 CAPSULE, LIQUID FILLED ORAL at 00:23

## 2020-10-12 RX ADMIN — SODIUM CHLORIDE, PRESERVATIVE FREE 10 ML: 5 INJECTION INTRAVENOUS at 20:10

## 2020-10-12 RX ADMIN — SENNOSIDES AND DOCUSATE SODIUM 2 TABLET: 8.6; 5 TABLET ORAL at 20:08

## 2020-10-12 RX ADMIN — NIMODIPINE 60 MG: 30 CAPSULE, LIQUID FILLED ORAL at 04:51

## 2020-10-12 RX ADMIN — NIMODIPINE 60 MG: 30 CAPSULE, LIQUID FILLED ORAL at 13:26

## 2020-10-12 RX ADMIN — NIMODIPINE 60 MG: 30 CAPSULE, LIQUID FILLED ORAL at 20:08

## 2020-10-12 RX ADMIN — NIMODIPINE 60 MG: 30 CAPSULE, LIQUID FILLED ORAL at 08:00

## 2020-10-12 RX ADMIN — PROMETHAZINE HYDROCHLORIDE 12.5 MG: 25 INJECTION, SOLUTION INTRAMUSCULAR; INTRAVENOUS at 08:37

## 2020-10-12 RX ADMIN — HYDROCODONE BITARTRATE AND ACETAMINOPHEN 1 TABLET: 5; 325 TABLET ORAL at 20:08

## 2020-10-12 RX ADMIN — SENNOSIDES AND DOCUSATE SODIUM 2 TABLET: 8.6; 5 TABLET ORAL at 08:00

## 2020-10-12 RX ADMIN — PROMETHAZINE HYDROCHLORIDE 12.5 MG: 25 INJECTION, SOLUTION INTRAMUSCULAR; INTRAVENOUS at 20:38

## 2020-10-12 RX ADMIN — TAMSULOSIN HYDROCHLORIDE 0.4 MG: 0.4 CAPSULE ORAL at 08:00

## 2020-10-12 RX ADMIN — ONDANSETRON HYDROCHLORIDE 4 MG: 2 SOLUTION INTRAMUSCULAR; INTRAVENOUS at 04:38

## 2020-10-12 RX ADMIN — ONDANSETRON HYDROCHLORIDE 4 MG: 2 SOLUTION INTRAMUSCULAR; INTRAVENOUS at 23:08

## 2020-10-12 RX ADMIN — NICARDIPINE HYDROCHLORIDE 2.5 MG/HR: 0.1 INJECTION, SOLUTION INTRAVENOUS at 00:23

## 2020-10-12 RX ADMIN — ONDANSETRON HYDROCHLORIDE 4 MG: 2 SOLUTION INTRAMUSCULAR; INTRAVENOUS at 15:57

## 2020-10-12 RX ADMIN — SODIUM CHLORIDE 100 ML/HR: 9 INJECTION, SOLUTION INTRAVENOUS at 04:38
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
## 2020-10-12 NOTE — BRIEF OP NOTE
"CEREBRAL ANGIOGRAM IR  Progress Note    David Dempsey  10/12/2020    Pre-op Diagnosis:      * SAH (subarachnoid hemorrhage) (CMS/[facility]) [I60.9]       Post-Op Diagnosis Codes:     * SAH (subarachnoid hemorrhage) (CMS/[facility]) [I60.9]    Procedure/CPT® Codes:        Procedure(s):  Cerebral angiogram    Surgeon(s):  Francisco Vasquez MD    Anesthesia: * No anesthesia type entered *    Staff:   Circulator: Aster Mackenzie RN  Scrub Person: Giancarlo Acevedo  Documenter: Zuleyka Quezada RN         Estimated Blood Loss: minimal    Urine Voided: * No values recorded between 10/12/2020  9:35 AM and 10/12/2020 10:27 AM *    Specimens:                None          Drains: * No LDAs found *    Findings: Four-vessel cerebral angiogram is unremarkable.  Specifically, there is no aneurysm, vascular malformation, dural fistula, changes of vasculitis/vasospasm, or other \"culprit\" lesion to account for the patient's subarachnoid hemorrhage.  Incidentally noted is a tiny \"infundibulum\" associated with the origin of the right posterior communicating artery, generally viewed as an incidental finding and variant of normal.    Complications: None apparent.          Francisco Vasquez MD     Date: 10/12/2020  Time: 10:34 EDT      "

## 2020-10-12 NOTE — PROGRESS NOTES
"Clinical Nutrition Note      Patient Name: David Depmsey  MRN: [id number]  Admission date: 10/11/2020      Multidisciplinary Rounds    Additional information obtained during MDR:  RN reports pt came to ED for severe HA , has a hx of SDH and crani; he was found to have SAH and transferred here. He is NPO for angiogram this morning.    Current diet: Diet Regular    Oral Nutrition Supplement:     Pertinent medical data reviewed:  No nutrition risk identified on nursing screen; MST score \"0\"    Intervention:  Menu provided  Plan of care and goals reviewed    Monitor:  RD to follow per protocol      Denae Sheikh MS,RD,LD  10/12/20 11:42 EDT  Time: 15  mins       "
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
## 2020-10-12 NOTE — PLAN OF CARE
Problem: Adult Inpatient Plan of Care  Goal: Plan of Care Review  Outcome: Ongoing, Progressing  Flowsheets (Taken 10/12/2020 1145)  Plan of Care Reviewed With: patient   SLP evaluation completed. Will sign-off for s/l/cognitive deficits. Please see note for further details and recommendations.

## 2020-10-12 NOTE — PROGRESS NOTES
"INTENSIVIST NOTE     Hospital Day: 1    Mr. David Dempsey, 50 y.o. male is followed for:   Subarachnoid hemorrhage, hypertension, BPH, and GERD       SUBJECTIVE     50-year-old male who developed the sudden onset of a severe headache with nausea and photophobia on 10/11.  He was transferred to our hospital after a CT scan demonstrated a subarachnoid hemorrhage.  He underwent diagnostic angiogram on 10/12 which revealed no culprit lesion for his bleed.  He has a history of a prior left frontal craniotomy in July 2019 for subdural hematoma.  Other medical problems are hypertension, BPH, GERD, and tobacco use.    Interval history:    Status post diagnostic angiogram today which revealed no culprit lesion for his subarachnoid hemorrhage.  He is afebrile.  Fluid balance +1.9 L.  Awake and alert.    The patient's relevant past medical, surgical and social history were reviewed and updated in Epic as appropriate.       OBJECTIVE     Vital Sign Min/Max for last 24 hours  Temp  Min: 97.7 °F (36.5 °C)  Max: 98.5 °F (36.9 °C)   BP  Min: 104/66  Max: 148/78   Pulse  Min: 62  Max: 128   Resp  Min: 14  Max: 16   SpO2  Min: 92 %  Max: 99 %   No data recorded   Weight  Min: 54.3 kg (119 lb 11.4 oz)  Max: 54.3 kg (119 lb 11.4 oz)      Intake/Output Summary (Last 24 hours) at 10/12/2020 1726  Last data filed at 10/12/2020 1600  Gross per 24 hour   Intake 3341.1 ml   Output 1400 ml   Net 1941.1 ml      Flowsheet Rows      First Filed Value   Admission Height  165.1 cm (65\") Documented at 10/11/2020 2029   Admission Weight  54.3 kg (119 lb 11.4 oz) Documented at 10/11/2020 2029             10/11/20  2029 10/12/20  0500 10/12/20  1451   Weight: 54.3 kg (119 lb 11.4 oz) 54.3 kg (119 lb 11.4 oz) 54.3 kg (119 lb 11.4 oz)            Objective:  General Appearance:  In no acute distress.    Vital signs: (most recent): Blood pressure 126/74, pulse 68, temperature 98.4 °F (36.9 °C), temperature source Oral, resp. rate 14, height 165.1 cm (65\"), " weight 54.3 kg (119 lb 11.4 oz), SpO2 95 %.    HEENT: Normal HEENT exam.    Lungs:  Normal effort and normal respiratory rate.  Breath sounds clear to auscultation.  He is not in respiratory distress.  No rales, wheezes or rhonchi.    Heart: Normal rate.  Regular rhythm.  S1 normal and S2 normal.  No murmur, gallop or friction rub.   Chest: Symmetric chest wall expansion.   Abdomen: Abdomen is soft and non-distended.  Bowel sounds are normal.   There is no abdominal tenderness.   There is no mass. There is no splenomegaly. There is no hepatomegaly.   Extremities: There is no deformity or dependent edema.  (Right femoral catheterization site without bleeding or hematoma)  Neurological: Patient is alert and oriented to person, place and time.    Pupils:  Pupils are equal, round, and reactive to light.    Skin:  Warm and dry.              I reviewed the patient's new clinical results.  I reviewed the patient's new imaging results/reports including actual images and agree with reports.      Chest X-Ray: NAD    INFUSIONS  niCARdipine, 5-15 mg/hr, Last Rate: Stopped (10/12/20 0130)  sodium chloride, 100 mL/hr, Last Rate: 100 mL/hr (10/12/20 1557)        Results from last 7 days   Lab Units 10/12/20  0424 10/11/20  1717   WBC 10*3/mm3 10.89* 11.24*   HEMOGLOBIN g/dL 14.4 15.5   HEMATOCRIT % 44.3 48.0   PLATELETS 10*3/mm3 233 238     Results from last 7 days   Lab Units 10/12/20  0424 10/11/20  1717   SODIUM mmol/L 136 136   POTASSIUM mmol/L 3.9 4.4   CHLORIDE mmol/L 104 105   CO2 mmol/L 23.0 24.0   BUN mg/dL 9 8   GLUCOSE mg/dL 116* 133*   CREATININE mg/dL 0.82 0.79   MAGNESIUM mg/dL 2.7* 1.8   CALCIUM mg/dL 8.0* 8.4*                 [facility]:      I reviewed the patient's medications.    Assessment/Plan   ASSESSMENT/PLAN     Active Hospital Problems    Diagnosis   • **SAH    • H/O SDH s/p L frontal craniotomy (7/23/19)   • Tobacco use (1.5 PPD)   • HTN   • Marijuana use   • BPH   • GERD       1. Subarachnoid  hemorrhage: No culprit lesion by angiogram today  2. Hypertension: Cardene drip to maintain blood pressure within parameters  3. GERD: H2 blocker  4. BPH: Tamsulosin    1. Nimodipine  2. Daily transcranial Dopplers  3. Continued ICU management     I discussed the patient's findings and my recommendations with patient and nursing staff     Plan of care and goals reviewed with multidisciplinary team at daily rounds.    High level of risk due to:  illness with threat to life or bodily function.    Trevor Ulloa MD  Pulmonary and Critical Care Medicine  10/12/20 17:26 EDT

## 2020-10-12 NOTE — PLAN OF CARE
Goal Outcome Evaluation:  Plan of Care Reviewed With: patient  Progress: improving  Outcome Summary: NIHSS 0. No neuro changes. Pt continues to complain of HA, PRN meds administered throughout shift with relief. SBP maintained within desired range, Cradene off at 0130. Mag replaced, reacheck in the AM. Stroke book education provided and consent obtained for cerebral angiography this AM. Passed dysphagia screen, NPO as of midnight. VSS, will continue to monitor.   Pt called out around 0430 and complained of feeling sick. Upon arrival to the room, pt had vomited. PRN Zofran administered and a cool wash cloth applied. NIHSS 0, pt denied any headache at the time and claimed to feel relief after Zofran was administered, will continue to closely monitor.

## 2020-10-12 NOTE — CONSULTS
Chart review for diabetes educator consult.    At the time of this review patient A1c is 5.5 , they have no noted history of diabetes and no home medications noted for treatment of diabetes. At this time we do not feel the patient would benefit from diabetes education. Thank you for this consult, should patient needs change please re consult us.

## 2020-10-12 NOTE — PROGRESS NOTES
Discharge Planning Assessment  [facility]     Patient Name: David Dempsey  MRN: [id number]  Today's Date: 10/12/2020    Admit Date: 10/11/2020    Discharge Needs Assessment     Row Name 10/12/20 1116       Living Environment    Lives With  significant other;grandchild(moises)    Current Living Arrangements  home/apartment/condo    Primary Care Provided by  self    Provides Primary Care For  no one    Family Caregiver if Needed  significant other    Quality of Family Relationships  involved;helpful       Transition Planning    Patient/Family Anticipates Transition to  home with family    Patient/Family Anticipated Services at Transition  none    Transportation Anticipated  family or friend will provide       Discharge Needs Assessment    Readmission Within the Last 30 Days  no previous admission in last 30 days    Equipment Currently Used at Home  none    Concerns to be Addressed  discharge planning    Anticipated Changes Related to Illness  none    Equipment Needed After Discharge  none        Discharge Plan     Row Name 10/12/20 1116       Plan    Plan  home with family    Patient/Family in Agreement with Plan  yes    Plan Comments  Pt lives in [facility] with his significant other and three grandchildren. He was independent with all ADLs prior to admit and denies use of any DME/HH. He is followed by his PCP and reports he has drug coverage. At this time his plan for discharge is to return home. CM to follow for any discharge needs.    Final Discharge Disposition Code  01 - home or self-care        Continued Care and Services - Admitted Since 10/11/2020    Coordination has not been started for this encounter.         Demographic Summary     Row Name 10/12/20 1115       General Information    Admission Type  inpatient    Referral Source  physician    Reason for Consult  discharge planning    General Information Comments  PCP Saundra Eduardo       Contact Information    Permission Granted to Share Info With   family/designee    Contact Information Comments  Archana Gray [phone]        Functional Status     Row Name 10/12/20 1115       Functional Status, IADL    Medications  independent    Meal Preparation  independent    Housekeeping  independent    Laundry  independent    Shopping  independent       Mental Status    General Appearance WDL  WDL       Mental Status Summary    Recent Changes in Mental Status/Cognitive Functioning  no changes        Psychosocial    No documentation.       Abuse/Neglect    No documentation.       Legal    No documentation.       Substance Abuse    No documentation.       Patient Forms    No documentation.           Julianna Nixon RN
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
## 2020-10-12 NOTE — PATIENT CARE CONFERENCE
ICU Rounds: OT/PT will defer evaluations this date as pt to undergo angio this AM w/ subsequent bed rest. If pt to undergo intervention will need new OT/PT orders once medically appropriate post-op.

## 2020-10-13 ENCOUNTER — APPOINTMENT (OUTPATIENT)
Dept: CARDIOLOGY | Facility: HOSPITAL | Age: 50
End: 2020-10-13

## 2020-10-13 LAB
ALBUMIN SERPL-MCNC: 3.5 G/DL (ref 3.5–5.2)
ANION GAP SERPL CALCULATED.3IONS-SCNC: 10 MMOL/L (ref 5–15)
ANION GAP SERPL CALCULATED.3IONS-SCNC: 6 MMOL/L (ref 5–15)
BASOPHILS # BLD AUTO: 0.01 10*3/MM3 (ref 0–0.2)
BASOPHILS NFR BLD AUTO: 0.1 % (ref 0–1.5)
BH CV VAS TCD LEFT ACA: 53 CM/SEC
BH CV VAS TCD LEFT ACA: 55 CM/SEC
BH CV VAS TCD LEFT DISTAL M1: 31 CM/SEC
BH CV VAS TCD LEFT DISTAL M1: 36 CM/SEC
BH CV VAS TCD LEFT MID M1: 48 CM/SEC
BH CV VAS TCD LEFT MID M1: 48 CM/SEC
BH CV VAS TCD LEFT P1: 35 CM/SEC
BH CV VAS TCD LEFT P1: 37 CM/SEC
BH CV VAS TCD LEFT P2: 15 CM/SEC
BH CV VAS TCD LEFT P2: 19 CM/SEC
BH CV VAS TCD LEFT PROXIMAL M1: 47 CM/SEC
BH CV VAS TCD LEFT PROXIMAL M1: 58 CM/SEC
BH CV VAS TCD LEFT TERMINAL ICA: 38 CM/SEC
BH CV VAS TCD LEFT TERMINAL ICA: 49 CM/SEC
BH CV VAS TCD RIGHT ACA: 62 CM/SEC
BH CV VAS TCD RIGHT ACA: 62 CM/SEC
BH CV VAS TCD RIGHT DISTAL M1: 48 CM/SEC
BH CV VAS TCD RIGHT DISTAL M1: 50 CM/SEC
BH CV VAS TCD RIGHT MID M1: 41 CM/SEC
BH CV VAS TCD RIGHT MID M1: 46 CM/SEC
BH CV VAS TCD RIGHT P1: 34 CM/SEC
BH CV VAS TCD RIGHT P1: 38 CM/SEC
BH CV VAS TCD RIGHT P2: 18 CM/SEC
BH CV VAS TCD RIGHT P2: 18 CM/SEC
BH CV VAS TCD RIGHT PROXIMAL M1: 42 CM/SEC
BH CV VAS TCD RIGHT PROXIMAL M1: 47 CM/SEC
BH CV VAS TCD RIGHT TERMINAL ICA: 38 CM/SEC
BH CV VAS TCD RIGHT TERMINAL ICA: 45 CM/SEC
BH CV XLRA MEAS LEFT VERTEBRAL A PSV: 40 CM/SEC
BH CV XLRA MEAS RIGHT VERTEBRAL A PSV: 38 CM/SEC
BUN SERPL-MCNC: 11 MG/DL (ref 6–20)
BUN SERPL-MCNC: 9 MG/DL (ref 6–20)
BUN/CREAT SERPL: 11.8 (ref 7–25)
BUN/CREAT SERPL: 14.5 (ref 7–25)
CALCIUM SPEC-SCNC: 7.8 MG/DL (ref 8.6–10.5)
CALCIUM SPEC-SCNC: 8.4 MG/DL (ref 8.6–10.5)
CHLORIDE SERPL-SCNC: 104 MMOL/L (ref 98–107)
CHLORIDE SERPL-SCNC: 105 MMOL/L (ref 98–107)
CO2 SERPL-SCNC: 23 MMOL/L (ref 22–29)
CO2 SERPL-SCNC: 26 MMOL/L (ref 22–29)
CREAT SERPL-MCNC: 0.76 MG/DL (ref 0.76–1.27)
CREAT SERPL-MCNC: 0.76 MG/DL (ref 0.76–1.27)
DEPRECATED RDW RBC AUTO: 48.4 FL (ref 37–54)
EOSINOPHIL # BLD AUTO: 0.08 10*3/MM3 (ref 0–0.4)
EOSINOPHIL NFR BLD AUTO: 0.8 % (ref 0.3–6.2)
ERYTHROCYTE [DISTWIDTH] IN BLOOD BY AUTOMATED COUNT: 13.6 % (ref 12.3–15.4)
GFR SERPL CREATININE-BSD FRML MDRD: 108 ML/MIN/1.73
GFR SERPL CREATININE-BSD FRML MDRD: 108 ML/MIN/1.73
GLUCOSE SERPL-MCNC: 101 MG/DL (ref 65–99)
GLUCOSE SERPL-MCNC: 109 MG/DL (ref 65–99)
HCT VFR BLD AUTO: 38.2 % (ref 37.5–51)
HGB BLD-MCNC: 12.4 G/DL (ref 13–17.7)
IMM GRANULOCYTES # BLD AUTO: 0.03 10*3/MM3 (ref 0–0.05)
IMM GRANULOCYTES NFR BLD AUTO: 0.3 % (ref 0–0.5)
LYMPHOCYTES # BLD AUTO: 1.04 10*3/MM3 (ref 0.7–3.1)
LYMPHOCYTES NFR BLD AUTO: 10.3 % (ref 19.6–45.3)
MAGNESIUM SERPL-MCNC: 1.9 MG/DL (ref 1.6–2.6)
MCH RBC QN AUTO: 30.9 PG (ref 26.6–33)
MCHC RBC AUTO-ENTMCNC: 32.5 G/DL (ref 31.5–35.7)
MCV RBC AUTO: 95.3 FL (ref 79–97)
MONOCYTES # BLD AUTO: 0.66 10*3/MM3 (ref 0.1–0.9)
MONOCYTES NFR BLD AUTO: 6.5 % (ref 5–12)
NEUTROPHILS NFR BLD AUTO: 8.3 10*3/MM3 (ref 1.7–7)
NEUTROPHILS NFR BLD AUTO: 82 % (ref 42.7–76)
NRBC BLD AUTO-RTO: 0 /100 WBC (ref 0–0.2)
PHOSPHATE SERPL-MCNC: 1.9 MG/DL (ref 2.5–4.5)
PHOSPHATE SERPL-MCNC: 2.3 MG/DL (ref 2.5–4.5)
PLATELET # BLD AUTO: 190 10*3/MM3 (ref 140–450)
PMV BLD AUTO: 9.8 FL (ref 6–12)
POTASSIUM SERPL-SCNC: 3.5 MMOL/L (ref 3.5–5.2)
POTASSIUM SERPL-SCNC: 4.1 MMOL/L (ref 3.5–5.2)
RBC # BLD AUTO: 4.01 10*6/MM3 (ref 4.14–5.8)
SODIUM SERPL-SCNC: 137 MMOL/L (ref 136–145)
SODIUM SERPL-SCNC: 137 MMOL/L (ref 136–145)
WBC # BLD AUTO: 10.12 10*3/MM3 (ref 3.4–10.8)

## 2020-10-13 PROCEDURE — 85025 COMPLETE CBC W/AUTO DIFF WBC: CPT | Performed by: INTERNAL MEDICINE

## 2020-10-13 PROCEDURE — 25010000003 MAGNESIUM SULFATE 4 GM/100ML SOLUTION: Performed by: PHYSICIAN ASSISTANT

## 2020-10-13 PROCEDURE — 97161 PT EVAL LOW COMPLEX 20 MIN: CPT

## 2020-10-13 PROCEDURE — 25010000002 HYDROMORPHONE PER 4 MG: Performed by: PHYSICIAN ASSISTANT

## 2020-10-13 PROCEDURE — 80048 BASIC METABOLIC PNL TOTAL CA: CPT | Performed by: INTERNAL MEDICINE

## 2020-10-13 PROCEDURE — 84100 ASSAY OF PHOSPHORUS: CPT | Performed by: NEUROLOGICAL SURGERY

## 2020-10-13 PROCEDURE — 25010000002 PROMETHAZINE PER 50 MG: Performed by: PHYSICIAN ASSISTANT

## 2020-10-13 PROCEDURE — 80069 RENAL FUNCTION PANEL: CPT | Performed by: INTERNAL MEDICINE

## 2020-10-13 PROCEDURE — 97165 OT EVAL LOW COMPLEX 30 MIN: CPT

## 2020-10-13 PROCEDURE — 93886 INTRACRANIAL COMPLETE STUDY: CPT

## 2020-10-13 PROCEDURE — 83735 ASSAY OF MAGNESIUM: CPT | Performed by: INTERNAL MEDICINE

## 2020-10-13 PROCEDURE — 99231 SBSQ HOSP IP/OBS SF/LOW 25: CPT | Performed by: NEUROLOGICAL SURGERY

## 2020-10-13 PROCEDURE — 99232 SBSQ HOSP IP/OBS MODERATE 35: CPT | Performed by: INTERNAL MEDICINE

## 2020-10-13 PROCEDURE — 25010000002 ONDANSETRON PER 1 MG: Performed by: PHYSICIAN ASSISTANT

## 2020-10-13 RX ADMIN — ONDANSETRON HYDROCHLORIDE 4 MG: 2 SOLUTION INTRAMUSCULAR; INTRAVENOUS at 20:15

## 2020-10-13 RX ADMIN — NIMODIPINE 60 MG: 30 CAPSULE, LIQUID FILLED ORAL at 08:37

## 2020-10-13 RX ADMIN — HYDROMORPHONE HYDROCHLORIDE 0.5 MG: 1 INJECTION, SOLUTION INTRAMUSCULAR; INTRAVENOUS; SUBCUTANEOUS at 19:18

## 2020-10-13 RX ADMIN — HYDROCODONE BITARTRATE AND ACETAMINOPHEN 1 TABLET: 5; 325 TABLET ORAL at 16:05

## 2020-10-13 RX ADMIN — TAMSULOSIN HYDROCHLORIDE 0.4 MG: 0.4 CAPSULE ORAL at 08:37

## 2020-10-13 RX ADMIN — SODIUM PHOSPHATE, MONOBASIC, MONOHYDRATE AND SODIUM PHOSPHATE, DIBASIC, ANHYDROUS 15 MMOL: 276; 142 INJECTION, SOLUTION INTRAVENOUS at 08:43

## 2020-10-13 RX ADMIN — ONDANSETRON HYDROCHLORIDE 4 MG: 2 SOLUTION INTRAMUSCULAR; INTRAVENOUS at 12:00

## 2020-10-13 RX ADMIN — SODIUM CHLORIDE 100 ML/HR: 9 INJECTION, SOLUTION INTRAVENOUS at 16:10

## 2020-10-13 RX ADMIN — SENNOSIDES AND DOCUSATE SODIUM 2 TABLET: 8.6; 5 TABLET ORAL at 08:37

## 2020-10-13 RX ADMIN — SODIUM CHLORIDE 100 ML/HR: 9 INJECTION, SOLUTION INTRAVENOUS at 02:55

## 2020-10-13 RX ADMIN — NIMODIPINE 60 MG: 30 CAPSULE, LIQUID FILLED ORAL at 16:05

## 2020-10-13 RX ADMIN — HYDROCODONE BITARTRATE AND ACETAMINOPHEN 1 TABLET: 5; 325 TABLET ORAL at 02:54

## 2020-10-13 RX ADMIN — NIMODIPINE 60 MG: 30 CAPSULE, LIQUID FILLED ORAL at 12:00

## 2020-10-13 RX ADMIN — NIMODIPINE 60 MG: 30 CAPSULE, LIQUID FILLED ORAL at 00:18

## 2020-10-13 RX ADMIN — HYDROCODONE BITARTRATE AND ACETAMINOPHEN 1 TABLET: 5; 325 TABLET ORAL at 09:12

## 2020-10-13 RX ADMIN — FAMOTIDINE 20 MG: 20 TABLET ORAL at 20:15

## 2020-10-13 RX ADMIN — HYDROCODONE BITARTRATE AND ACETAMINOPHEN 1 TABLET: 5; 325 TABLET ORAL at 22:30

## 2020-10-13 RX ADMIN — SODIUM CHLORIDE, PRESERVATIVE FREE 10 ML: 5 INJECTION INTRAVENOUS at 20:15

## 2020-10-13 RX ADMIN — NIMODIPINE 60 MG: 30 CAPSULE, LIQUID FILLED ORAL at 20:15

## 2020-10-13 RX ADMIN — NIMODIPINE 60 MG: 30 CAPSULE, LIQUID FILLED ORAL at 04:20

## 2020-10-13 RX ADMIN — SENNOSIDES AND DOCUSATE SODIUM 2 TABLET: 8.6; 5 TABLET ORAL at 20:15

## 2020-10-13 RX ADMIN — FAMOTIDINE 20 MG: 20 TABLET ORAL at 08:36

## 2020-10-13 RX ADMIN — PROMETHAZINE HYDROCHLORIDE 12.5 MG: 25 INJECTION, SOLUTION INTRAMUSCULAR; INTRAVENOUS at 22:30

## 2020-10-13 RX ADMIN — MAGNESIUM SULFATE HEPTAHYDRATE 4 G: 40 INJECTION, SOLUTION INTRAVENOUS at 07:03

## 2020-10-13 RX ADMIN — POTASSIUM PHOSPHATE, MONOBASIC AND POTASSIUM PHOSPHATE, DIBASIC 15 MMOL: 224; 236 INJECTION, SOLUTION, CONCENTRATE INTRAVENOUS at 20:35

## 2020-10-13 RX ADMIN — ONDANSETRON HYDROCHLORIDE 4 MG: 2 SOLUTION INTRAMUSCULAR; INTRAVENOUS at 05:42
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
## 2020-10-13 NOTE — PROGRESS NOTES
Subjective: Hospital day 3, postoperative day 1 status post diagnostic cerebral angiography.  No obvious structural etiology for the patient's hemorrhage.    Objective:    Vitals:    10/13/20 1015   BP: 112/60   Pulse: 90   Resp:    Temp:    SpO2: 93%     Pulse  Av.3  Min: 57  Max: 90  Systolic (24hrs), Av , Min:102 , Max:148     Diastolic (24hrs), Av, Min:49, Max:86    Temp (24hrs), Av.8 °F (36.6 °C), Min:97.3 °F (36.3 °C), Max:98.4 °F (36.9 °C)      He is awake, sitting up in bed.  Endorses moderate head pain and nuchal rigidity.  He follows commands in all 4 extremities and is nonfocal.    Lab Results   Component Value Date     10/13/2020       A/P:   CTA, diagnostic cerebral angiography negative.  Presumptive etiology is non-aneurysmal subarachnoid hemorrhage.  Continue ICU, continue subarachnoid hemorrhage protocol  Transcranial Doppler today  Continue nimodipine   Guard against hyponatremia and hypovolemia.  Watch for vasospasm.  No additional neurosurgical recommendations at this time.

## 2020-10-13 NOTE — PROGRESS NOTES
"Clinical Nutrition Note      Patient Name: David Dempsey  MRN: [id number]  Admission date: 10/11/2020      Multidisciplinary Rounds    Additional information obtained during MDR:  RN reports pt to have daily TCDs  to monitor for vasospasm, NIH score \"0\" still w/ HA, nausea improved w/ IV phenergan.    Current diet: Diet Regular    Oral Nutrition Supplement:     Pertinent medical data reviewed:  No nutrition risk identified on nursing screen; MST score \"0\"  Na+ 137; NS @ 100 ml/hr.    Intervention:  Plan of care and goals reviewed    Monitor:  RD to follow per protocol      Denae Sheikh MS,RD,LD  10/13/20 11:29 EDT  Time: 15  mins       "

## 2020-10-13 NOTE — PROGRESS NOTES
"INTENSIVIST NOTE     Hospital Day: 2    Mr. David Dempsey, 50 y.o. male is followed for:   Subarachnoid hemorrhage, hypertension, BPH, and GERD       SUBJECTIVE     50-year-old male who developed the sudden onset of a severe headache with nausea and photophobia on 10/11.  He was transferred to our hospital after a CT scan demonstrated a subarachnoid hemorrhage.  He underwent diagnostic angiogram on 10/12 which revealed no culprit lesion for his bleed.  He has a history of a prior left frontal craniotomy in July 2019 for subdural hematoma.  Other medical problems are hypertension, BPH, GERD, and tobacco use.  Diagnostic angiogram on 10/12/2020 revealed no culprit lesions.    Interval history:    Alert and oriented x3.  Afebrile.  Hemodynamics acceptable.  Normal sinus rhythm.  Fluid balance positive.    The patient's relevant past medical, surgical and social history were reviewed and updated in Epic as appropriate.       OBJECTIVE     Vital Sign Min/Max for last 24 hours  Temp  Min: 97.3 °F (36.3 °C)  Max: 98.4 °F (36.9 °C)   BP  Min: 100/64  Max: 138/80   Pulse  Min: 57  Max: 97   Resp  Min: 14  Max: 18   SpO2  Min: 88 %  Max: 98 %   No data recorded   Weight  Min: 54.3 kg (119 lb 11.4 oz)  Max: 54.3 kg (119 lb 11.4 oz)      Intake/Output Summary (Last 24 hours) at 10/13/2020 1507  Last data filed at 10/13/2020 1400  Gross per 24 hour   Intake 2831.6 ml   Output 1225 ml   Net 1606.6 ml      Flowsheet Rows      First Filed Value   Admission Height  165.1 cm (65\") Documented at 10/11/2020 2029   Admission Weight  54.3 kg (119 lb 11.4 oz) Documented at 10/11/2020 2029             10/12/20  0500 10/12/20  1451 10/13/20  0945   Weight: 54.3 kg (119 lb 11.4 oz) 54.3 kg (119 lb 11.4 oz) 54.3 kg (119 lb 11.4 oz)            Objective:  General Appearance:  In no acute distress.    Vital signs: (most recent): Blood pressure 115/61, pulse 73, temperature 98.4 °F (36.9 °C), temperature source Oral, resp. rate 18, height 165.1 " "cm (65\"), weight 54.3 kg (119 lb 11.4 oz), SpO2 93 %.    HEENT: Normal HEENT exam.    Lungs:  Normal effort and normal respiratory rate.  Breath sounds clear to auscultation.  He is not in respiratory distress.  No rales, wheezes or rhonchi.    Heart: Normal rate.  Regular rhythm.  S1 normal and S2 normal.  No murmur, gallop or friction rub.   Chest: Symmetric chest wall expansion.   Abdomen: Abdomen is soft and non-distended.  Bowel sounds are normal.   There is no abdominal tenderness.   There is no mass. There is no splenomegaly. There is no hepatomegaly.   Extremities: There is no deformity or dependent edema.  (Right femoral catheterization site without bleeding or hematoma)  Neurological: Patient is alert and oriented to person, place and time.    Pupils:  Pupils are equal, round, and reactive to light.    Skin:  Warm and dry.              I reviewed the patient's new clinical results.  I reviewed the patient's new imaging results/reports including actual images and agree with reports.      Chest X-Ray: NAD    INFUSIONS  niCARdipine, 5-15 mg/hr, Last Rate: Stopped (10/12/20 0130)  sodium chloride, 100 mL/hr, Last Rate: 100 mL/hr (10/13/20 0255)        Results from last 7 days   Lab Units 10/13/20  0458 10/12/20  0424 10/11/20  1717   WBC 10*3/mm3 10.12 10.89* 11.24*   HEMOGLOBIN g/dL 12.4* 14.4 15.5   HEMATOCRIT % 38.2 44.3 48.0   PLATELETS 10*3/mm3 190 233 238     Results from last 7 days   Lab Units 10/13/20  0458 10/12/20  0424 10/11/20  1717   SODIUM mmol/L 137 136 136   POTASSIUM mmol/L 4.1 3.9 4.4   CHLORIDE mmol/L 104 104 105   CO2 mmol/L 23.0 23.0 24.0   BUN mg/dL 11 9 8   GLUCOSE mg/dL 101* 116* 133*   CREATININE mg/dL 0.76 0.82 0.79   MAGNESIUM mg/dL 1.9 2.7* 1.8   CALCIUM mg/dL 8.4* 8.0* 8.4*                 [facility] Ventilation:      I reviewed the patient's medications.    Assessment/Plan   ASSESSMENT/PLAN     Active Hospital Problems    Diagnosis   • **SAH    • H/O SDH s/p L frontal craniotomy " (7/23/19)   • Tobacco use (1.5 PPD)   • HTN   • Marijuana use   • BPH   • GERD       1. Subarachnoid hemorrhage: No culprit lesion by angiogram  2. Hypertension: Blood pressure control within parameters  3. GERD: H2 blocker  4. BPH: Tamsulosin    1. Nimodipine  2. Daily transcranial Dopplers  3. Avoid hyponatremia or hypovolemia  4. Continued ICU management     I discussed the patient's findings and my recommendations with patient and nursing staff     Plan of care and goals reviewed with multidisciplinary team at daily rounds.    High level of risk due to:  illness with threat to life or bodily function.    Trevor Ulloa MD  Pulmonary and Critical Care Medicine  10/13/20 15:07 EDT

## 2020-10-13 NOTE — PLAN OF CARE
Goal Outcome Evaluation:  Plan of Care Reviewed With: patient  Progress: improving    NIHSS=0. Baseline neurological status with no deficits noted or reported. No neurological changes this shift. VSS. SBP less than 140 this shift. ZHU. Follows commands. Fi02 weaned from 2lpm nc to room air. Right groin site drsg remains CDI and soft without ecchymosis/hematoma. Pulses and cap refill remain appropriate. Continued headache, neck pain, and nausea noted. No vomiting this shift. MD aware, ongoing since admission. See flowsheets/MAR. Pt ambulated to bathroom with standby assist only. BMx1, 2 spontaneous voids noted. NAD noted. Safety measures intact.

## 2020-10-13 NOTE — PLAN OF CARE
Problem: Adult Inpatient Plan of Care  Goal: Plan of Care Review  Recent Flowsheet Documentation  Taken 10/13/2020 1613 by Tamie Swartz, PT  Plan of Care Reviewed With: patient  Outcome Summary: PT initial evaluation completed for pt initially presenting with headache and nausea. Pt ambulated 400ft with supervision and ascended/descended 5 steps x2 with CGA. Pt demonstrated good balance and stability throughout mobility. Pt does not present with any deficits warranting PT skilled care, safe to continue ambulating with nursing. Recommend D/C home with assistance.
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
## 2020-10-13 NOTE — PLAN OF CARE
Goal Outcome Evaluation:  Plan of Care Reviewed With: patient, significant other  Progress: improving  Outcome Summary: NIH 0 this am. Neurologically intact. Complains of headache and neck pain. PRN norco adminsitered as ordered. Daily TCD completed. Nimotop continued. Toelrating RA. Lung sounds diminished. VSS. Afebrile. SBP remained less than 140. Appetite improving. Nausea much improved this shift. Groin site C/D/I/ soft IVF continued. Magnesium and phosphorus replaced per protocol.

## 2020-10-13 NOTE — PLAN OF CARE
Problem: Adult Inpatient Plan of Care  Goal: Plan of Care Review  Recent Flowsheet Documentation  Taken 10/13/2020 1528 by Era Wolf OT  Progress: improving  Plan of Care Reviewed With: patient  Outcome Summary: OT eval complete. Pt is independent w/ mobility and ADLs. Pt has no further deficits which require cont'd skilled IPOT intervention, will sign off. Recommend pt DC home w/ assist.

## 2020-10-14 ENCOUNTER — APPOINTMENT (OUTPATIENT)
Dept: CARDIOLOGY | Facility: HOSPITAL | Age: 50
End: 2020-10-14

## 2020-10-14 LAB
ANION GAP SERPL CALCULATED.3IONS-SCNC: 8 MMOL/L (ref 5–15)
BASOPHILS # BLD AUTO: 0.02 10*3/MM3 (ref 0–0.2)
BASOPHILS NFR BLD AUTO: 0.2 % (ref 0–1.5)
BUN SERPL-MCNC: 9 MG/DL (ref 6–20)
BUN/CREAT SERPL: 10.5 (ref 7–25)
CALCIUM SPEC-SCNC: 8.4 MG/DL (ref 8.6–10.5)
CHLORIDE SERPL-SCNC: 107 MMOL/L (ref 98–107)
CO2 SERPL-SCNC: 26 MMOL/L (ref 22–29)
CREAT SERPL-MCNC: 0.86 MG/DL (ref 0.76–1.27)
DEPRECATED RDW RBC AUTO: 49.2 FL (ref 37–54)
EOSINOPHIL # BLD AUTO: 0.06 10*3/MM3 (ref 0–0.4)
EOSINOPHIL NFR BLD AUTO: 0.5 % (ref 0.3–6.2)
ERYTHROCYTE [DISTWIDTH] IN BLOOD BY AUTOMATED COUNT: 13.6 % (ref 12.3–15.4)
GFR SERPL CREATININE-BSD FRML MDRD: 93 ML/MIN/1.73
GLUCOSE SERPL-MCNC: 96 MG/DL (ref 65–99)
HCT VFR BLD AUTO: 38.8 % (ref 37.5–51)
HGB BLD-MCNC: 12.5 G/DL (ref 13–17.7)
IMM GRANULOCYTES # BLD AUTO: 0.05 10*3/MM3 (ref 0–0.05)
IMM GRANULOCYTES NFR BLD AUTO: 0.4 % (ref 0–0.5)
LYMPHOCYTES # BLD AUTO: 1.04 10*3/MM3 (ref 0.7–3.1)
LYMPHOCYTES NFR BLD AUTO: 8.7 % (ref 19.6–45.3)
MAGNESIUM SERPL-MCNC: 2 MG/DL (ref 1.6–2.6)
MCH RBC QN AUTO: 31.3 PG (ref 26.6–33)
MCHC RBC AUTO-ENTMCNC: 32.2 G/DL (ref 31.5–35.7)
MCV RBC AUTO: 97.2 FL (ref 79–97)
MONOCYTES # BLD AUTO: 0.84 10*3/MM3 (ref 0.1–0.9)
MONOCYTES NFR BLD AUTO: 7 % (ref 5–12)
NEUTROPHILS NFR BLD AUTO: 83.2 % (ref 42.7–76)
NEUTROPHILS NFR BLD AUTO: 9.92 10*3/MM3 (ref 1.7–7)
NRBC BLD AUTO-RTO: 0 /100 WBC (ref 0–0.2)
PHOSPHATE SERPL-MCNC: 2.4 MG/DL (ref 2.5–4.5)
PHOSPHATE SERPL-MCNC: 2.6 MG/DL (ref 2.5–4.5)
PLATELET # BLD AUTO: 197 10*3/MM3 (ref 140–450)
PMV BLD AUTO: 9.9 FL (ref 6–12)
POTASSIUM SERPL-SCNC: 4.3 MMOL/L (ref 3.5–5.2)
RBC # BLD AUTO: 3.99 10*6/MM3 (ref 4.14–5.8)
SODIUM SERPL-SCNC: 141 MMOL/L (ref 136–145)
WBC # BLD AUTO: 11.93 10*3/MM3 (ref 3.4–10.8)

## 2020-10-14 PROCEDURE — 83735 ASSAY OF MAGNESIUM: CPT | Performed by: NEUROLOGICAL SURGERY

## 2020-10-14 PROCEDURE — 84100 ASSAY OF PHOSPHORUS: CPT | Performed by: INTERNAL MEDICINE

## 2020-10-14 PROCEDURE — 84100 ASSAY OF PHOSPHORUS: CPT | Performed by: NURSE PRACTITIONER

## 2020-10-14 PROCEDURE — 85025 COMPLETE CBC W/AUTO DIFF WBC: CPT | Performed by: INTERNAL MEDICINE

## 2020-10-14 PROCEDURE — 93886 INTRACRANIAL COMPLETE STUDY: CPT | Performed by: STUDENT IN AN ORGANIZED HEALTH CARE EDUCATION/TRAINING PROGRAM

## 2020-10-14 PROCEDURE — 93886 INTRACRANIAL COMPLETE STUDY: CPT

## 2020-10-14 PROCEDURE — 80048 BASIC METABOLIC PNL TOTAL CA: CPT | Performed by: INTERNAL MEDICINE

## 2020-10-14 PROCEDURE — 25010000002 ONDANSETRON PER 1 MG: Performed by: PHYSICIAN ASSISTANT

## 2020-10-14 PROCEDURE — 99232 SBSQ HOSP IP/OBS MODERATE 35: CPT | Performed by: INTERNAL MEDICINE

## 2020-10-14 PROCEDURE — 25010000002 HYDROMORPHONE PER 4 MG: Performed by: PHYSICIAN ASSISTANT

## 2020-10-14 RX ADMIN — HYDROMORPHONE HYDROCHLORIDE 0.5 MG: 1 INJECTION, SOLUTION INTRAMUSCULAR; INTRAVENOUS; SUBCUTANEOUS at 04:09

## 2020-10-14 RX ADMIN — NIMODIPINE 60 MG: 30 CAPSULE, LIQUID FILLED ORAL at 08:37

## 2020-10-14 RX ADMIN — FAMOTIDINE 20 MG: 20 TABLET ORAL at 08:38

## 2020-10-14 RX ADMIN — HYDROMORPHONE HYDROCHLORIDE 0.5 MG: 1 INJECTION, SOLUTION INTRAMUSCULAR; INTRAVENOUS; SUBCUTANEOUS at 19:24

## 2020-10-14 RX ADMIN — HYDROCODONE BITARTRATE AND ACETAMINOPHEN 1 TABLET: 5; 325 TABLET ORAL at 22:24

## 2020-10-14 RX ADMIN — ONDANSETRON HYDROCHLORIDE 4 MG: 2 SOLUTION INTRAMUSCULAR; INTRAVENOUS at 12:41

## 2020-10-14 RX ADMIN — NIMODIPINE 60 MG: 30 CAPSULE, LIQUID FILLED ORAL at 12:38

## 2020-10-14 RX ADMIN — NIMODIPINE 60 MG: 30 CAPSULE, LIQUID FILLED ORAL at 04:08

## 2020-10-14 RX ADMIN — HYDROCODONE BITARTRATE AND ACETAMINOPHEN 1 TABLET: 5; 325 TABLET ORAL at 14:28

## 2020-10-14 RX ADMIN — SODIUM CHLORIDE 100 ML/HR: 9 INJECTION, SOLUTION INTRAVENOUS at 00:39

## 2020-10-14 RX ADMIN — HYDROCODONE BITARTRATE AND ACETAMINOPHEN 1 TABLET: 5; 325 TABLET ORAL at 07:07

## 2020-10-14 RX ADMIN — NIMODIPINE 60 MG: 30 CAPSULE, LIQUID FILLED ORAL at 16:03

## 2020-10-14 RX ADMIN — NIMODIPINE 60 MG: 30 CAPSULE, LIQUID FILLED ORAL at 00:39

## 2020-10-14 RX ADMIN — SODIUM CHLORIDE, PRESERVATIVE FREE 10 ML: 5 INJECTION INTRAVENOUS at 20:37

## 2020-10-14 RX ADMIN — SODIUM PHOSPHATE, MONOBASIC, MONOHYDRATE AND SODIUM PHOSPHATE, DIBASIC, ANHYDROUS 15 MMOL: 276; 142 INJECTION, SOLUTION INTRAVENOUS at 10:01

## 2020-10-14 RX ADMIN — TAMSULOSIN HYDROCHLORIDE 0.4 MG: 0.4 CAPSULE ORAL at 08:38

## 2020-10-14 RX ADMIN — FAMOTIDINE 20 MG: 20 TABLET ORAL at 20:37

## 2020-10-14 RX ADMIN — NIMODIPINE 60 MG: 30 CAPSULE, LIQUID FILLED ORAL at 19:42

## 2020-10-14 RX ADMIN — ONDANSETRON HYDROCHLORIDE 4 MG: 2 SOLUTION INTRAMUSCULAR; INTRAVENOUS at 19:11

## 2020-10-14 RX ADMIN — ONDANSETRON HYDROCHLORIDE 4 MG: 2 SOLUTION INTRAMUSCULAR; INTRAVENOUS at 05:18

## 2020-10-14 NOTE — PLAN OF CARE
Goal Outcome Evaluation:  Plan of Care Reviewed With: patient  Progress: improving  Outcome Summary: Pt out of bed ambulating to bathroom but refusing to sit up in chair at bedside. ongoing c/o HA and nausea PRN meds given. Poor PO intake. Continue to mx BP and allow for permissive HTN to SBP of 200 per DR. Iraheta's note

## 2020-10-14 NOTE — PROGRESS NOTES
"INTENSIVIST NOTE     Hospital Day: 3    Mr. David Dempsey, 50 y.o. male is followed for:   Subarachnoid hemorrhage, hypertension, BPH, and GERD       SUBJECTIVE     50-year-old male who developed the sudden onset of a severe headache with nausea and photophobia on 10/11.  He was transferred to our hospital after a CT scan demonstrated a subarachnoid hemorrhage.  He underwent diagnostic angiogram on 10/12 which revealed no culprit lesion for his bleed.  He has a history of a prior left frontal craniotomy in July 2019 for subdural hematoma.  Other medical problems are hypertension, BPH, GERD, and tobacco use.  Diagnostic angiogram on 10/12/2020 revealed no culprit lesions.    Interval history:    Alert and oriented x3.  Afebrile.  Hemodynamics acceptable per parameters.  Normal sinus rhythm.  Fluid balance +1.7 L.    The patient's relevant past medical, surgical and social history were reviewed and updated in Epic as appropriate.       OBJECTIVE     Vital Sign Min/Max for last 24 hours  Temp  Min: 97.6 °F (36.4 °C)  Max: 98.7 °F (37.1 °C)   BP  Min: 91/43  Max: 151/80   Pulse  Min: 50  Max: 101   Resp  Min: 16  Max: 18   SpO2  Min: 80 %  Max: 100 %   No data recorded   Weight  Min: 54 kg (119 lb)  Max: 54 kg (119 lb)      Intake/Output Summary (Last 24 hours) at 10/14/2020 1629  Last data filed at 10/14/2020 1559  Gross per 24 hour   Intake 2407.33 ml   Output 750 ml   Net 1657.33 ml      Flowsheet Rows      First Filed Value   Admission Height  165.1 cm (65\") Documented at 10/11/2020 2029   Admission Weight  54.3 kg (119 lb 11.4 oz) Documented at 10/11/2020 2029             10/12/20  1451 10/13/20  0945 10/14/20  1218   Weight: 54.3 kg (119 lb 11.4 oz) 54.3 kg (119 lb 11.4 oz) 54 kg (119 lb)            Objective:  General Appearance:  In no acute distress.    Vital signs: (most recent): Blood pressure 148/86, pulse 68, temperature 98.1 °F (36.7 °C), temperature source Oral, resp. rate 16, height 170 cm (66.93\"), " weight 54 kg (119 lb), SpO2 (!) 80 %.    HEENT: Normal HEENT exam.    Lungs:  Normal effort and normal respiratory rate.  Breath sounds clear to auscultation.  He is not in respiratory distress.  No rales, wheezes or rhonchi.    Heart: Normal rate.  Regular rhythm.  S1 normal and S2 normal.  No murmur, gallop or friction rub.   Chest: Symmetric chest wall expansion.   Abdomen: Abdomen is soft and non-distended.  Bowel sounds are normal.   There is no abdominal tenderness.   There is no mass. There is no splenomegaly. There is no hepatomegaly.   Extremities: There is no deformity or dependent edema.  (Right femoral catheterization site without bleeding or hematoma)  Neurological: Patient is alert and oriented to person, place and time.    Pupils:  Pupils are equal, round, and reactive to light.    Skin:  Warm and dry.              I reviewed the patient's new clinical results.  I reviewed the patient's new imaging results/reports including actual images and agree with reports.      Chest X-Ray: No additional    INFUSIONS  niCARdipine, 5-15 mg/hr, Last Rate: Stopped (10/12/20 0130)  sodium chloride, 50 mL/hr, Last Rate: 50 mL/hr (10/14/20 1002)        Results from last 7 days   Lab Units 10/14/20  0557 10/13/20  0458 10/12/20  0424   WBC 10*3/mm3 11.93* 10.12 10.89*   HEMOGLOBIN g/dL 12.5* 12.4* 14.4   HEMATOCRIT % 38.8 38.2 44.3   PLATELETS 10*3/mm3 197 190 233     Results from last 7 days   Lab Units 10/14/20  0557 10/13/20  1847 10/13/20  0458 10/12/20  0424   SODIUM mmol/L 141 137 137 136   POTASSIUM mmol/L 4.3 3.5 4.1 3.9   CHLORIDE mmol/L 107 105 104 104   CO2 mmol/L 26.0 26.0 23.0 23.0   BUN mg/dL 9 9 11 9   GLUCOSE mg/dL 96 109* 101* 116*   CREATININE mg/dL 0.86 0.76 0.76 0.82   MAGNESIUM mg/dL 2.0  --  1.9 2.7*   CALCIUM mg/dL 8.4* 7.8* 8.4* 8.0*                 Mech Ventilation:      I reviewed the patient's medications.    Assessment/Plan   ASSESSMENT/PLAN     Active Hospital Problems    Diagnosis   • **SAH     • H/O SDH s/p L frontal craniotomy (7/23/19)   • Tobacco use (1.5 PPD)   • HTN   • Marijuana use   • BPH   • GERD       1. Subarachnoid hemorrhage: No culprit lesion by angiogram  2. Hypertension: Relaxed blood pressure parameters per neurosurgery recommendations to treat only for SBP greater than 200 mmHg  3. GERD: H2 blocker  4. BPH: Tamsulosin    1. Nimodipine  2. Daily transcranial Dopplers  3. Avoid hyponatremia or hypovolemia.  Back off normal saline.  4. Continued ICU management per neurosurgery recommendations     I discussed the patient's findings and my recommendations with patient and nursing staff     Plan of care and goals reviewed with multidisciplinary team at daily rounds.    High level of risk due to:  illness with threat to life or bodily function.    Trevor Ulloa MD  Pulmonary and Critical Care Medicine  10/14/20 16:29 EDT

## 2020-10-14 NOTE — PROGRESS NOTES
"Clinical Nutrition Note      Patient Name: David Dempsey  MRN: [id number]  Admission date: 10/11/2020      Multidisciplinary Rounds    Additional information obtained during MDR:  RN reports Dr. Iraheta increased BP parameters, sodium okay. Pt sleepy this am ate some of breakfast.    Current diet: Diet Regular    Oral Nutrition Supplement:     Pertinent medical data reviewed:  No nutrition risk identified on nursing screen; MST score \"0\"  Intake data: 25% consumption of 1 meal documented since adm; insufficient documentation    Intervention:  Oral intake documentation requested via sticky note  Plan of care and goals reviewed    Monitor:  RD to follow per protocol      Denae Sheikh MS,RD,LD  10/14/20 11:38 EDT  Time: 15  mins       "

## 2020-10-14 NOTE — PROGRESS NOTES
TCD normal  Na normal  No clincial s/s of vasospasm  Continue ICU ans SAH protocol  Post bleed day 3 entering spasm window  Permissive HTN would not treat SBP < 200  No add'l neurosurgery recs at this time

## 2020-10-14 NOTE — PROGRESS NOTES
Continued Stay Note  [facility]     Patient Name: David Dempsey  MRN: [id number]  Today's Date: 10/14/2020    Admit Date: 10/11/2020    Discharge Plan     Row Name 10/14/20 1404       Plan    Plan  Home    Patient/Family in Agreement with Plan  yes    Plan Comments  Per PT/OT recs patient needs home with assistance.  Plan is home with girlfriend and family. Cm following for any needs.    Final Discharge Disposition Code  01 - home or self-care        Discharge Codes    No documentation.       Expected Discharge Date and Time     Expected Discharge Date Expected Discharge Time    Oct 17, 2020             Elaine Ta RN

## 2020-10-15 ENCOUNTER — APPOINTMENT (OUTPATIENT)
Dept: CARDIOLOGY | Facility: HOSPITAL | Age: 50
End: 2020-10-15

## 2020-10-15 LAB
ANION GAP SERPL CALCULATED.3IONS-SCNC: 7 MMOL/L (ref 5–15)
BUN SERPL-MCNC: 9 MG/DL (ref 6–20)
BUN/CREAT SERPL: 12.3 (ref 7–25)
CALCIUM SPEC-SCNC: 8.6 MG/DL (ref 8.6–10.5)
CHLORIDE SERPL-SCNC: 100 MMOL/L (ref 98–107)
CO2 SERPL-SCNC: 27 MMOL/L (ref 22–29)
CREAT SERPL-MCNC: 0.73 MG/DL (ref 0.76–1.27)
GFR SERPL CREATININE-BSD FRML MDRD: 113 ML/MIN/1.73
GLUCOSE SERPL-MCNC: 88 MG/DL (ref 65–99)
POTASSIUM SERPL-SCNC: 3.7 MMOL/L (ref 3.5–5.2)
SODIUM SERPL-SCNC: 134 MMOL/L (ref 136–145)

## 2020-10-15 PROCEDURE — 80048 BASIC METABOLIC PNL TOTAL CA: CPT | Performed by: INTERNAL MEDICINE

## 2020-10-15 PROCEDURE — 84295 ASSAY OF SERUM SODIUM: CPT | Performed by: INTERNAL MEDICINE

## 2020-10-15 PROCEDURE — 93886 INTRACRANIAL COMPLETE STUDY: CPT

## 2020-10-15 PROCEDURE — C1751 CATH, INF, PER/CENT/MIDLINE: HCPCS

## 2020-10-15 PROCEDURE — 25010000002 HYDROMORPHONE PER 4 MG: Performed by: PHYSICIAN ASSISTANT

## 2020-10-15 PROCEDURE — 02HV33Z INSERTION OF INFUSION DEVICE INTO SUPERIOR VENA CAVA, PERCUTANEOUS APPROACH: ICD-10-PCS | Performed by: NEUROLOGICAL SURGERY

## 2020-10-15 PROCEDURE — 25010000002 PROMETHAZINE PER 50 MG: Performed by: PHYSICIAN ASSISTANT

## 2020-10-15 PROCEDURE — 84295 ASSAY OF SERUM SODIUM: CPT | Performed by: PHYSICIAN ASSISTANT

## 2020-10-15 PROCEDURE — 99231 SBSQ HOSP IP/OBS SF/LOW 25: CPT | Performed by: PHYSICIAN ASSISTANT

## 2020-10-15 PROCEDURE — 25010000002 ONDANSETRON PER 1 MG: Performed by: PHYSICIAN ASSISTANT

## 2020-10-15 PROCEDURE — 84295 ASSAY OF SERUM SODIUM: CPT | Performed by: NEUROLOGICAL SURGERY

## 2020-10-15 PROCEDURE — C1894 INTRO/SHEATH, NON-LASER: HCPCS

## 2020-10-15 PROCEDURE — 99232 SBSQ HOSP IP/OBS MODERATE 35: CPT | Performed by: INTERNAL MEDICINE

## 2020-10-15 RX ORDER — 3% SODIUM CHLORIDE 3 G/100ML
500 INJECTION, SOLUTION INTRAVENOUS CONTINUOUS
Status: DISCONTINUED | OUTPATIENT
Start: 2020-10-15 | End: 2020-10-16

## 2020-10-15 RX ORDER — SODIUM CHLORIDE 0.9 % (FLUSH) 0.9 %
10 SYRINGE (ML) INJECTION EVERY 12 HOURS SCHEDULED
Status: DISCONTINUED | OUTPATIENT
Start: 2020-10-15 | End: 2020-10-21 | Stop reason: HOSPADM

## 2020-10-15 RX ORDER — SODIUM CHLORIDE 0.9 % (FLUSH) 0.9 %
10 SYRINGE (ML) INJECTION AS NEEDED
Status: DISCONTINUED | OUTPATIENT
Start: 2020-10-15 | End: 2020-10-21 | Stop reason: HOSPADM

## 2020-10-15 RX ORDER — 3% SODIUM CHLORIDE 3 G/100ML
300 INJECTION, SOLUTION INTRAVENOUS CONTINUOUS
Status: DISCONTINUED | OUTPATIENT
Start: 2020-10-15 | End: 2020-10-15

## 2020-10-15 RX ADMIN — ONDANSETRON HYDROCHLORIDE 4 MG: 2 SOLUTION INTRAMUSCULAR; INTRAVENOUS at 10:34

## 2020-10-15 RX ADMIN — SODIUM CHLORIDE: 234 INJECTION, SOLUTION INTRAVENOUS at 15:48

## 2020-10-15 RX ADMIN — FAMOTIDINE 20 MG: 20 TABLET ORAL at 08:05

## 2020-10-15 RX ADMIN — HYDROCODONE BITARTRATE AND ACETAMINOPHEN 1 TABLET: 5; 325 TABLET ORAL at 05:50

## 2020-10-15 RX ADMIN — HYDROMORPHONE HYDROCHLORIDE 0.5 MG: 1 INJECTION, SOLUTION INTRAMUSCULAR; INTRAVENOUS; SUBCUTANEOUS at 03:14

## 2020-10-15 RX ADMIN — SODIUM CHLORIDE: 234 INJECTION, SOLUTION INTRAVENOUS at 11:25

## 2020-10-15 RX ADMIN — HYDROMORPHONE HYDROCHLORIDE 0.5 MG: 1 INJECTION, SOLUTION INTRAMUSCULAR; INTRAVENOUS; SUBCUTANEOUS at 22:34

## 2020-10-15 RX ADMIN — SODIUM CHLORIDE, PRESERVATIVE FREE 10 ML: 5 INJECTION INTRAVENOUS at 20:30

## 2020-10-15 RX ADMIN — NIMODIPINE 60 MG: 30 CAPSULE, LIQUID FILLED ORAL at 04:33

## 2020-10-15 RX ADMIN — ONDANSETRON HYDROCHLORIDE 4 MG: 2 SOLUTION INTRAMUSCULAR; INTRAVENOUS at 04:34

## 2020-10-15 RX ADMIN — NIMODIPINE 60 MG: 30 CAPSULE, LIQUID FILLED ORAL at 15:48

## 2020-10-15 RX ADMIN — HYDROMORPHONE HYDROCHLORIDE 0.5 MG: 1 INJECTION, SOLUTION INTRAMUSCULAR; INTRAVENOUS; SUBCUTANEOUS at 20:30

## 2020-10-15 RX ADMIN — HYDROMORPHONE HYDROCHLORIDE 0.5 MG: 1 INJECTION, SOLUTION INTRAMUSCULAR; INTRAVENOUS; SUBCUTANEOUS at 14:49

## 2020-10-15 RX ADMIN — NIMODIPINE 60 MG: 30 CAPSULE, LIQUID FILLED ORAL at 11:54

## 2020-10-15 RX ADMIN — HYDROMORPHONE HYDROCHLORIDE 0.5 MG: 1 INJECTION, SOLUTION INTRAMUSCULAR; INTRAVENOUS; SUBCUTANEOUS at 11:53

## 2020-10-15 RX ADMIN — NIMODIPINE 60 MG: 30 CAPSULE, LIQUID FILLED ORAL at 23:36

## 2020-10-15 RX ADMIN — PROMETHAZINE HYDROCHLORIDE 12.5 MG: 25 INJECTION, SOLUTION INTRAMUSCULAR; INTRAVENOUS at 03:14

## 2020-10-15 RX ADMIN — FAMOTIDINE 20 MG: 20 TABLET ORAL at 20:30

## 2020-10-15 RX ADMIN — NIMODIPINE 60 MG: 30 CAPSULE, LIQUID FILLED ORAL at 08:05

## 2020-10-15 RX ADMIN — TAMSULOSIN HYDROCHLORIDE 0.4 MG: 0.4 CAPSULE ORAL at 08:06

## 2020-10-15 RX ADMIN — ONDANSETRON HYDROCHLORIDE 4 MG: 2 SOLUTION INTRAMUSCULAR; INTRAVENOUS at 22:34

## 2020-10-15 RX ADMIN — SODIUM CHLORIDE, PRESERVATIVE FREE 10 ML: 5 INJECTION INTRAVENOUS at 08:10

## 2020-10-15 RX ADMIN — HYDROCODONE BITARTRATE AND ACETAMINOPHEN 1 TABLET: 5; 325 TABLET ORAL at 23:36

## 2020-10-15 RX ADMIN — NIMODIPINE 60 MG: 30 CAPSULE, LIQUID FILLED ORAL at 20:30

## 2020-10-15 RX ADMIN — SODIUM CHLORIDE 500 ML: 3 INJECTION, SOLUTION INTRAVENOUS at 17:43

## 2020-10-15 RX ADMIN — HYDROMORPHONE HYDROCHLORIDE 0.5 MG: 1 INJECTION, SOLUTION INTRAMUSCULAR; INTRAVENOUS; SUBCUTANEOUS at 00:15

## 2020-10-15 RX ADMIN — SODIUM CHLORIDE 50 ML/HR: 9 INJECTION, SOLUTION INTRAVENOUS at 04:37

## 2020-10-15 RX ADMIN — NIMODIPINE 60 MG: 30 CAPSULE, LIQUID FILLED ORAL at 00:08

## 2020-10-15 RX ADMIN — HYDROCODONE BITARTRATE AND ACETAMINOPHEN 1 TABLET: 5; 325 TABLET ORAL at 13:05

## 2020-10-15 RX ADMIN — HYDROMORPHONE HYDROCHLORIDE 0.5 MG: 1 INJECTION, SOLUTION INTRAMUSCULAR; INTRAVENOUS; SUBCUTANEOUS at 17:43

## 2020-10-15 RX ADMIN — HYDROMORPHONE HYDROCHLORIDE 0.5 MG: 1 INJECTION, SOLUTION INTRAMUSCULAR; INTRAVENOUS; SUBCUTANEOUS at 08:08

## 2020-10-15 NOTE — PAYOR COMM NOTE
"Rosio Quiroga RN   Phone [phone]  Fax [phone]      David Carlin (50 y.o. Male)     Date of Birth Social Security Number Address Home Phone MRN    1970  [id number]  [place] [address] [phone] [id number]    Anabaptist Marital Status          Unknown Single       Admission Date Admission Type Admitting Provider Attending Provider Department, Room/Bed    10/11/20 Elective Aguila Coronado MD Newman, Charles Benjamin, MD [facility] 2B ICU, N239/1    Discharge Date Discharge Disposition Discharge Destination                       Attending Provider: Favian Iraheta MD    Allergies: Penicillins, Grass    Isolation: None   Infection: None   Code Status: CPR    Ht: 170 cm (66.93\")   Wt: 54 kg (119 lb)    Admission Cmt: None   Principal Problem: SAH  [I60.9]                 Active Insurance as of 10/11/2020     Primary Coverage     Payor Plan Insurance Group Employer/Plan Group    [id number]     Payor Plan Address Payor Plan Phone Number Payor Plan Fax Number Effective Dates    [address] [phone]  1/1/2019 - None Entered    [facility] [id number]       Subscriber Name Subscriber Birth Date Member ID       DAVID CARLIN 1970 [id number]                 Emergency Contacts      (Rel.) Home Phone Work Phone Mobile Phone    Archana Gray (Significant Other) [phone] -- [phone]    Ranjit Carlin (Daughter) -- -- [phone]            Vital Signs (last day)     Date/Time   Temp   Temp src   Pulse   Resp   BP   Patient Position   SpO2    10/15/20 0930   --   --   85   --   131/89   --   93    10/15/20 0915   --   --   56   --   --   --   93    10/15/20 0900   --   --   56   --   150/89   --   91    10/15/20 0830   --   --   (!) 48   --   134/88   --   92    10/15/20 0815   --   --   58   --   --   --   94    10/15/20 0805   --   --   --   --   136/79   --   --    10/15/20 0800   " 98.7 (37.1)   Oral   66   18   136/79   Lying   94    10/15/20 0745   --   --   51   --   --   --   93    10/15/20 0730   --   --   54   --   142/87   --   92    10/15/20 0715   --   --   56   --   --   --   93    10/15/20 0700   --   --   54   --   134/71   --   94    10/15/20 0630   --   --   56   --   136/77   --   92    10/15/20 0600   --   --   60   --   139/92   --   93    10/15/20 0530   --   --   56   --   132/72   --   (!) 89    10/15/20 0500   --   --   56   --   163/92   --   95    10/15/20 0430   --   --   55   --   137/71   --   93    10/15/20 0400   97.7 (36.5)   Oral   51   18   143/83   Lying   94    10/15/20 0330   --   --   50   --   142/80   --   93    10/15/20 0300   --   --   51   --   141/71   --   93    10/15/20 0230   --   --   (!) 49   --   149/57   --   93    10/15/20 0200   --   --   58   --   131/80   --   94    10/15/20 0130   --   --   56   --   142/88   --   93    10/15/20 0100   --   --   60   --   149/82   --   93    10/15/20 0030   --   --   56   --   147/84   --   92    10/15/20 0000   98.3 (36.8)   Axillary   52   18   157/97   Lying   94    10/14/20 2330   --   --   50   --   154/96   --   95    10/14/20 2300   --   --   59   --   154/85   --   93    10/14/20 2230   --   --   (!) 49   --   154/90   --   93    10/14/20 2200   --   --   63   --   154/94   --   93    10/14/20 2130   --   --   52   --   132/82   --   94    10/14/20 2100   --   --   53   --   146/88   --   92    10/14/20 2030   --   --   55   --   139/81   --   94    10/14/20 2000   98.1 (36.7)   Oral   61   16   156/93   Lying   95    10/14/20 1940   --   --   59   --   153/92   --   96    10/14/20 1900   --   --   61   --   156/94   --   97    10/14/20 1830   --   --   56   --   130/76   --   94    10/14/20 1800   --   --   50   --   140/69   --   94    10/14/20 1730   --   --   57   --   147/82   --   95    10/14/20 1700   --   --   70   --   148/84   --   97    10/14/20 1630   --   --   53   --   154/83   --   96     10/14/20 1600   --   --   56   --   158/91   --   94    10/14/20 1558   98.1 (36.7)   Oral   --   16   --   --   --    10/14/20 1530   --   --   68   --   148/86   --   (!) 80    10/14/20 1500   --   --   62   --   140/91   --   98    10/14/20 1430   --   --   67   --   148/88   --   96    10/14/20 1400   --   --   62   --   145/90   --   95    10/14/20 1330   --   --   64   --   141/94   --   96    10/14/20 1300   --   --   52   --   134/85   --   95    10/14/20 1200   98 (36.7)   Oral   66   16   132/82   --   95    10/14/20 1130   --   --   64   --   123/79   --   98    10/14/20 1100   --   --   79   --   123/77   --   95    10/14/20 1030   --   --   72   --   134/78   --   95    10/14/20 1000   --   --   64   --   123/70   --   94    10/14/20 0930   --   --   64   --   116/71   --   92    10/14/20 0900   98.4 (36.9)   Oral   52   16   132/90   --   96    10/14/20 0830   --   --   72   --   112/83   --   99    10/14/20 0805   --   --   --   --   151/80   --   --    10/14/20 0700   --   --   74   --   142/80   --   98    10/14/20 0630   --   --   57   --   132/76   --   99    10/14/20 0600   --   --   61   --   126/84   --   97    10/14/20 0530   --   --   54   --   131/78   --   97    10/14/20 0500   --   --   53   --   134/90   --   96    10/14/20 0430   --   --   54   --   138/81   --   95    10/14/20 0400   97.6 (36.4)   Oral   53   16   140/81   Lying   100    10/14/20 0330   --   --   50   --   134/87   --   98    10/14/20 0300   --   --   56   --   140/49   --   97    10/14/20 0230   --   --   57   --   117/74   --   99    10/14/20 0200   --   --   58   --   116/67   --   99    10/14/20 0130   --   --   68   --   103/66   --   90    10/14/20 0100   --   --   52   --   119/74   --   90    10/14/20 0030   --   --   56   --   109/71   --   91    10/14/20 0000   98.2 (36.8)   Oral   58   --   110/76   --   94              Current Facility-Administered Medications   Medication Dose Route Frequency Provider  Last Rate Last Dose   • acetaminophen (TYLENOL) tablet 1,000 mg  1,000 mg Oral Q6H PRN Екатерина De Los Santos PA-C       • dextrose (D50W) 25 g/ 50mL Intravenous Solution 25 g  25 g Intravenous Q15 Min PRN Екатерина De Los Santos PA-C       • dextrose (GLUTOSE) oral gel 15 g  15 g Oral Q15 Min PRN Екатерина De Los Santos PA-C       • famotidine (PEPCID) tablet 20 mg  20 mg Oral Q12H Adithya Lau, formerly [facility]   20 mg at 10/15/20 0805   • glucagon (human recombinant) (GLUCAGEN DIAGNOSTIC) injection 1 mg  1 mg Subcutaneous Q15 Min PRN Екатерина De Los Santos PA-C       • HYDROcodone-acetaminophen (NORCO) 5-325 MG per tablet 1 tablet  1 tablet Oral Q6H PRN Екатерина De Los Santos PA-C   1 tablet at 10/15/20 0550   • HYDROmorphone (DILAUDID) injection 0.5 mg  0.5 mg Intravenous Q2H PRN Екатерина De Los Santos PA-C   0.5 mg at 10/15/20 0808   • Magnesium Sulfate 2 gram Bolus, followed by 8 gram infusion (total Mg dose 10 grams)- Mg less than or equal to 1mg/dL  2 g Intravenous PRN Екатерина De Los Santos PA-C        Or   • Magnesium Sulfate 2 gram / 50mL Infusion (GIVE X 3 BAGS TO EQUAL 6GM TOTAL DOSE) - Mg 1.1 - 1.5 mg/dl  2 g Intravenous PRN Екатерина De Los Santos PA-C        Or   • Magnesium Sulfate 4 gram infusion- Mg 1.6-1.9 mg/dL  4 g Intravenous PRN Екатерина De Los Santos PA-C 25 mL/hr at 10/13/20 0703 4 g at 10/13/20 0703   • niCARdipine (CARDENE) 20 mg in 200 mL NS infusion  5-15 mg/hr Intravenous Titrated Trevor Ulloa MD   Stopped at 10/12/20 0130   • niMODipine (NIMOTOP) capsule 60 mg  60 mg Oral Q4H Екатерина De Los Santos PA-C   60 mg at 10/15/20 0805   • ondansetron (ZOFRAN) injection 4 mg  4 mg Intravenous Q6H PRN Екатерина De Los Santos PA-C   4 mg at 10/15/20 0434   • potassium chloride (MICRO-K) CR capsule 40 mEq  40 mEq Oral PRN Екатерина De Los Santos PA-C        Or   • potassium chloride (KLOR-CON) packet 40 mEq  40 mEq Oral PRN Екатерина De Los Santos PA-C        Or   • potassium chloride 10 mEq in 100 mL IVPB  10 mEq Intravenous Q1H PRN Filiberto,  SAGE WillamsC       • potassium phosphate 45 mmol in sodium chloride 0.9 % 500 mL infusion  45 mmol Intravenous PRN PinnixЕкатерина PA-C        Or   • potassium phosphate 30 mmol in sodium chloride 0.9 % 250 mL infusion  30 mmol Intravenous PRN Pinnix, Екатерина, PA-C        Or   • potassium phosphate 15 mmol in sodium chloride 0.9 % 100 mL infusion  15 mmol Intravenous PRN PinnixЕкатерина, PA-C   15 mmol at 10/13/20 2035    Or   • sodium phosphates 45 mmol in sodium chloride 0.9 % 250 mL IVPB  45 mmol Intravenous PRN Pinnix, Екатерина, PA-C        Or   • sodium phosphates 30 mmol in sodium chloride 0.9 % 250 mL IVPB  30 mmol Intravenous PRN PinnixЕкатерина PA-C        Or   • sodium phosphates 15 mmol in sodium chloride 0.9 % 250 mL IVPB  15 mmol Intravenous PRN PinnilsaxЕкатерина PA-C 62.5 mL/hr at 10/14/20 1001 15 mmol at 10/14/20 1001   • promethazine (PHENERGAN) IVPB 12.5 mg  12.5 mg Intravenous Q4H PRN Екатерина De Los Santos PA-C   12.5 mg at 10/15/20 0314   • sennosides-docusate (PERICOLACE) 8.6-50 MG per tablet 2 tablet  2 tablet Oral BID Екатерина De Los Santos PA-C   2 tablet at 10/13/20 2015   • sodium chloride 0.9 % flush 10 mL  10 mL Intravenous Q12H Екатерина De Los Santos PA-C   10 mL at 10/15/20 0810   • sodium chloride 0.9 % flush 10 mL  10 mL Intravenous PRN PinЕкатерина garcia PA-C       • sodium chloride 0.9 % infusion  50 mL/hr Intravenous Continuous Trevor Ulloa MD 50 mL/hr at 10/15/20 0437 50 mL/hr at 10/15/20 0437   • sodium chloride 1.5% (HYPERTONIC) 500 mL infusion   Intravenous Continuous Favian Iraheta MD       • tamsulosin (FLOMAX) 24 hr capsule 0.4 mg  0.4 mg Oral Daily Екатерина De Los Santos PA-C   0.4 mg at 10/15/20 0806     Lab Results (last 24 hours)     Procedure Component Value Units Date/Time    Sodium [[id number]]  (Abnormal) Collected: 10/15/20 0827    Specimen: Blood Updated: 10/15/20 0907     Sodium 134 mmol/L     Basic Metabolic Panel [[id number]]  (Abnormal)  Collected: 10/15/20 0532    Specimen: Blood Updated: 10/15/20 0810     Glucose 88 mg/dL      BUN 9 mg/dL      Creatinine 0.73 mg/dL      Sodium 134 mmol/L      Potassium 3.7 mmol/L      Comment: Slight hemolysis detected by analyzer. Results may be affected.        Chloride 100 mmol/L      CO2 27.0 mmol/L      Calcium 8.6 mg/dL      eGFR Non African Amer 113 mL/min/1.73      BUN/Creatinine Ratio 12.3     Anion Gap 7.0 mmol/L     Narrative:      GFR Normal >60  Chronic Kidney Disease <60  Kidney Failure <15      Phosphorus [[id number]]  (Normal) Collected: 10/14/20 1928    Specimen: Blood Updated: 10/14/20 2019     Phosphorus 2.6 mg/dL         Imaging Results (Last 24 Hours)     ** No results found for the last 24 hours. **           Physician Progress Notes (last 24 hours) (Notes from 10/14/20 1027 through 10/15/20 1027)      Favian Iraheta MD at 10/15/20 0815        TCD normal  Notable Na drop last 24 hours  Will repeat Na but suspect we'll need to start 1.5% NaCl  TCD today continue SAH protocol    Electronically signed by Favian Iraheta MD at 10/15/20 0816     Trevor Ulloa MD at 10/14/20 1628          INTENSIVIST NOTE     Hospital Day: 3    Mr. David Dempsey, 50 y.o. male is followed for:   Subarachnoid hemorrhage, hypertension, BPH, and GERD       SUBJECTIVE     50-year-old male who developed the sudden onset of a severe headache with nausea and photophobia on 10/11.  He was transferred to our hospital after a CT scan demonstrated a subarachnoid hemorrhage.  He underwent diagnostic angiogram on 10/12 which revealed no culprit lesion for his bleed.  He has a history of a prior left frontal craniotomy in July 2019 for subdural hematoma.  Other medical problems are hypertension, BPH, GERD, and tobacco use.  Diagnostic angiogram on 10/12/2020 revealed no culprit lesions.    Interval history:    Alert and oriented x3.  Afebrile.  Hemodynamics acceptable per parameters.  Normal sinus  "rhythm.  Fluid balance +1.7 L.    The patient's relevant past medical, surgical and social history were reviewed and updated in Epic as appropriate.       OBJECTIVE     Vital Sign Min/Max for last 24 hours  Temp  Min: 97.6 °F (36.4 °C)  Max: 98.7 °F (37.1 °C)   BP  Min: 91/43  Max: 151/80   Pulse  Min: 50  Max: 101   Resp  Min: 16  Max: 18   SpO2  Min: 80 %  Max: 100 %   No data recorded   Weight  Min: 54 kg (119 lb)  Max: 54 kg (119 lb)      Intake/Output Summary (Last 24 hours) at 10/14/2020 1629  Last data filed at 10/14/2020 1559  Gross per 24 hour   Intake 2407.33 ml   Output 750 ml   Net 1657.33 ml      Flowsheet Rows      First Filed Value   Admission Height  165.1 cm (65\") Documented at 10/11/2020 2029   Admission Weight  54.3 kg (119 lb 11.4 oz) Documented at 10/11/2020 2029             10/12/20  1451 10/13/20  0945 10/14/20  1218   Weight: 54.3 kg (119 lb 11.4 oz) 54.3 kg (119 lb 11.4 oz) 54 kg (119 lb)            Objective:  General Appearance:  In no acute distress.    Vital signs: (most recent): Blood pressure 148/86, pulse 68, temperature 98.1 °F (36.7 °C), temperature source Oral, resp. rate 16, height 170 cm (66.93\"), weight 54 kg (119 lb), SpO2 (!) 80 %.    HEENT: Normal HEENT exam.    Lungs:  Normal effort and normal respiratory rate.  Breath sounds clear to auscultation.  He is not in respiratory distress.  No rales, wheezes or rhonchi.    Heart: Normal rate.  Regular rhythm.  S1 normal and S2 normal.  No murmur, gallop or friction rub.   Chest: Symmetric chest wall expansion.   Abdomen: Abdomen is soft and non-distended.  Bowel sounds are normal.   There is no abdominal tenderness.   There is no mass. There is no splenomegaly. There is no hepatomegaly.   Extremities: There is no deformity or dependent edema.  (Right femoral catheterization site without bleeding or hematoma)  Neurological: Patient is alert and oriented to person, place and time.    Pupils:  Pupils are equal, round, and reactive to " light.    Skin:  Warm and dry.              I reviewed the patient's new clinical results.  I reviewed the patient's new imaging results/reports including actual images and agree with reports.      Chest X-Ray: No additional    INFUSIONS  niCARdipine, 5-15 mg/hr, Last Rate: Stopped (10/12/20 0130)  sodium chloride, 50 mL/hr, Last Rate: 50 mL/hr (10/14/20 1002)        Results from last 7 days   Lab Units 10/14/20  0557 10/13/20  0458 10/12/20  0424   WBC 10*3/mm3 11.93* 10.12 10.89*   HEMOGLOBIN g/dL 12.5* 12.4* 14.4   HEMATOCRIT % 38.8 38.2 44.3   PLATELETS 10*3/mm3 197 190 233     Results from last 7 days   Lab Units 10/14/20  0557 10/13/20  1847 10/13/20  0458 10/12/20  0424   SODIUM mmol/L 141 137 137 136   POTASSIUM mmol/L 4.3 3.5 4.1 3.9   CHLORIDE mmol/L 107 105 104 104   CO2 mmol/L 26.0 26.0 23.0 23.0   BUN mg/dL 9 9 11 9   GLUCOSE mg/dL 96 109* 101* 116*   CREATININE mg/dL 0.86 0.76 0.76 0.82   MAGNESIUM mg/dL 2.0  --  1.9 2.7*   CALCIUM mg/dL 8.4* 7.8* 8.4* 8.0*                 Mech Ventilation:      I reviewed the patient's medications.    Assessment/Plan   ASSESSMENT/PLAN     Active Hospital Problems    Diagnosis   • **SAH    • H/O SDH s/p L frontal craniotomy (7/23/19)   • Tobacco use (1.5 PPD)   • HTN   • Marijuana use   • BPH   • GERD       1. Subarachnoid hemorrhage: No culprit lesion by angiogram  2. Hypertension: Relaxed blood pressure parameters per neurosurgery recommendations to treat only for SBP greater than 200 mmHg  3. GERD: H2 blocker  4. BPH: Tamsulosin    1. Nimodipine  2. Daily transcranial Dopplers  3. Avoid hyponatremia or hypovolemia.  Back off normal saline.  4. Continued ICU management per neurosurgery recommendations     I discussed the patient's findings and my recommendations with patient and nursing staff     Plan of care and goals reviewed with multidisciplinary team at daily rounds.    High level of risk due to:  illness with threat to life or bodily function.    Trevor PENA  MD [facility]  Pulmonary and Critical Care Medicine  10/14/20 16:29 EDT         Electronically signed by Trevor Ulloa MD at 10/14/20 0209

## 2020-10-15 NOTE — PROGRESS NOTES
"NEUROSURGERY PROGRESS NOTE    Interval History:   Hospital day for admission for nonaneurysmal subarachnoid hemorrhage.  The patient had a drop in sodium overnight.  1.5% NaCl has been started.  The patient is reporting a 6/10 headache that improves with IV pain medication.  Denies any nausea, visual changes, weakness or numbness.    Vital Signs  Blood pressure 144/67, pulse 67, temperature 98.7 °F (37.1 °C), temperature source Oral, resp. rate 16, height 170 cm (66.93\"), weight 54 kg (119 lb), SpO2 93 %.    Physical Exam:  Patient is lying in bed.  He appears moderately uncomfortable secondary to his headache.  Cranial nerves II through XII are grossly intact.  No pronator drift.  Moves all extremities command with full strength.  Speech is fluent, answers questions appropriately.     Results Review:    I/O last 3 completed shifts:  In: 3305.4 [P.O.:360; I.V.:2588.6; IV Piggyback:356.8]  Out: 1300 [Urine:1300]  I/O this shift:  In: 225.1 [I.V.:225.1]  Out: 550 [Urine:550]      Assessment/Plan:   Subarachnoid hemorrhage  Continue ICU and subarachnoid hemorrhage protocol  Continue daily TCD's  Continue 1.5% NaCl at 50 mL/hour.  Q8H sodium checks  Continue nimodipine    Luz Meyer PA-C  10/15/20  12:53 EDT    "

## 2020-10-15 NOTE — PROGRESS NOTES
"INTENSIVIST NOTE     Hospital Day: 4    Mr. David Dempsey, 50 y.o. male is followed for:   Subarachnoid hemorrhage, hypertension, BPH, and GERD       SUBJECTIVE     50-year-old male who developed the sudden onset of a severe headache with nausea and photophobia on 10/11.  He was transferred to our hospital after a CT scan demonstrated a subarachnoid hemorrhage.  He underwent diagnostic angiogram on 10/12 which revealed no culprit lesion for his bleed.  He has a history of a prior left frontal craniotomy in July 2019 for subdural hematoma.  Other medical problems are hypertension, BPH, GERD, and tobacco use.  Diagnostic angiogram on 10/12/2020 revealed no culprit lesions.    Interval history:    Increased headache today.  Fluid balance even.  Sodium dropped to 134.    The patient's relevant past medical, surgical and social history were reviewed and updated in Epic as appropriate.       OBJECTIVE     Vital Sign Min/Max for last 24 hours  Temp  Min: 97.7 °F (36.5 °C)  Max: 98.7 °F (37.1 °C)   BP  Min: 123/71  Max: 170/83   Pulse  Min: 46  Max: 89   Resp  Min: 16  Max: 18   SpO2  Min: 83 %  Max: 97 %   No data recorded   Weight  Min: 54 kg (119 lb 0.8 oz)  Max: 54 kg (119 lb 0.8 oz)      Intake/Output Summary (Last 24 hours) at 10/15/2020 1642  Last data filed at 10/15/2020 1600  Gross per 24 hour   Intake 1828.13 ml   Output 1775 ml   Net 53.13 ml      Flowsheet Rows      First Filed Value   Admission Height  165.1 cm (65\") Documented at 10/11/2020 2029   Admission Weight  54.3 kg (119 lb 11.4 oz) Documented at 10/11/2020 2029             10/13/20  0945 10/14/20  1218 10/15/20  1427   Weight: 54.3 kg (119 lb 11.4 oz) 54 kg (119 lb) 54 kg (119 lb 0.8 oz)            Objective:  General Appearance:  In no acute distress.    Vital signs: (most recent): Blood pressure (!) 159/105, pulse 66, temperature 98.3 °F (36.8 °C), temperature source Oral, resp. rate 18, height 170 cm (66.93\"), weight 54 kg (119 lb 0.8 oz), SpO2 95 %. "    HEENT: Normal HEENT exam.    Lungs:  Normal effort and normal respiratory rate.  Breath sounds clear to auscultation.  He is not in respiratory distress.  No rales, wheezes or rhonchi.    Heart: Normal rate.  Regular rhythm.  S1 normal and S2 normal.  No murmur, gallop or friction rub.   Chest: Symmetric chest wall expansion.   Abdomen: Abdomen is soft and non-distended.  Bowel sounds are normal.   There is no abdominal tenderness.   There is no mass. There is no splenomegaly. There is no hepatomegaly.   Extremities: There is no deformity or dependent edema.  (Right femoral catheterization site without bleeding or hematoma)  Neurological: Patient is alert and oriented to person, place and time.    Pupils:  Pupils are equal, round, and reactive to light.    Skin:  Warm and dry.              I reviewed the patient's new clinical results.  I reviewed the patient's new imaging results/reports including actual images and agree with reports.      Chest X-Ray: No additional    INFUSIONS  sodium chloride 1.5% (HYPERTONIC) 500 mL infusion, , Last Rate: 50 mL/hr at 10/15/20 1548        Results from last 7 days   Lab Units 10/14/20  0557 10/13/20  0458 10/12/20  0424   WBC 10*3/mm3 11.93* 10.12 10.89*   HEMOGLOBIN g/dL 12.5* 12.4* 14.4   HEMATOCRIT % 38.8 38.2 44.3   PLATELETS 10*3/mm3 197 190 233     Results from last 7 days   Lab Units 10/15/20  1600 10/15/20  0827 10/15/20  0532 10/14/20  0557 10/13/20  1847 10/13/20  0458 10/12/20  0424   SODIUM mmol/L 134* 134* 134* 141 137 137 136   POTASSIUM mmol/L  --   --  3.7 4.3 3.5 4.1 3.9   CHLORIDE mmol/L  --   --  100 107 105 104 104   CO2 mmol/L  --   --  27.0 26.0 26.0 23.0 23.0   BUN mg/dL  --   --  9 9 9 11 9   GLUCOSE mg/dL  --   --  88 96 109* 101* 116*   CREATININE mg/dL  --   --  0.73* 0.86 0.76 0.76 0.82   MAGNESIUM mg/dL  --   --   --  2.0  --  1.9 2.7*   CALCIUM mg/dL  --   --  8.6 8.4* 7.8* 8.4* 8.0*                 Mech Ventilation:      I reviewed the patient's  medications.    Assessment/Plan   ASSESSMENT/PLAN     Active Hospital Problems    Diagnosis   • **SAH    • H/O SDH s/p L frontal craniotomy (7/23/19)   • Tobacco use (1.5 PPD)   • HTN   • Marijuana use   • BPH   • GERD       1. Subarachnoid hemorrhage: No culprit lesion by angiogram  2. Hypertension: Relaxed blood pressure parameters per neurosurgery recommendations to treat only for SBP greater than 200 mmHg  3. Relative hyponatremia: Initiate 1.5 saline  4. GERD: H2 blocker  5. BPH: Tamsulosin    1. Nimodipine  2. 1.5% saline  3. Serum sodium every 8 hours  4. Daily transcranial Dopplers  5. Continued ICU management per neurosurgery recommendations     I discussed the patient's findings and my recommendations with patient and nursing staff     Plan of care and goals reviewed with multidisciplinary team at daily rounds.    High level of risk due to:  illness with threat to life or bodily function.    Trevor Ulloa MD  Pulmonary and Critical Care Medicine  10/15/20 16:42 EDT

## 2020-10-15 NOTE — PROGRESS NOTES
TCD normal  Notable Na drop last 24 hours  Will repeat Na but suspect we'll need to start 1.5% NaCl  TCD today continue SAH protocol

## 2020-10-15 NOTE — CONSULTS
Placed by Shira Avilez RN - confirmed with 3cg.  RUE double lumen PICC with 38cm total and 0cm exposed.

## 2020-10-15 NOTE — PLAN OF CARE
Goal Outcome Evaluation:  Plan of Care Reviewed With: patient  Progress: no change  Outcome Summary: Patient got out of bed to ambulate to chair. Ongoing headache today and nausea. Received PRN pain medications and zofran today with moderate relief. Poor PO intake. Hypertonic saline started today per neurosurgery 1.5%, then 3% when Na did not respond. Q6Na ordered per JERMAINE SMITH. NIHSS 0 at beginning of shift and stayed stable throughout the day.

## 2020-10-16 ENCOUNTER — APPOINTMENT (OUTPATIENT)
Dept: CARDIOLOGY | Facility: HOSPITAL | Age: 50
End: 2020-10-16

## 2020-10-16 ENCOUNTER — APPOINTMENT (OUTPATIENT)
Dept: GENERAL RADIOLOGY | Facility: HOSPITAL | Age: 50
End: 2020-10-16

## 2020-10-16 LAB
ANION GAP SERPL CALCULATED.3IONS-SCNC: 8 MMOL/L (ref 5–15)
BASOPHILS # BLD AUTO: 0.02 10*3/MM3 (ref 0–0.2)
BASOPHILS NFR BLD AUTO: 0.2 % (ref 0–1.5)
BH CV VAS TCD LEFT ACA: 48 CM/SEC
BH CV VAS TCD LEFT ACA: 53 CM/SEC
BH CV VAS TCD LEFT DISTAL M1: 50 CM/SEC
BH CV VAS TCD LEFT DISTAL M1: 54 CM/SEC
BH CV VAS TCD LEFT MID M1: 66 CM/SEC
BH CV VAS TCD LEFT MID M1: 83 CM/SEC
BH CV VAS TCD LEFT P1: 40 CM/SEC
BH CV VAS TCD LEFT P1: 46 CM/SEC
BH CV VAS TCD LEFT P2: 20 CM/SEC
BH CV VAS TCD LEFT P2: 35 CM/SEC
BH CV VAS TCD LEFT PROXIMAL M1: 79 CM/SEC
BH CV VAS TCD LEFT PROXIMAL M1: 90 CM/SEC
BH CV VAS TCD LEFT TERMINAL ICA: 28 CM/SEC
BH CV VAS TCD LEFT TERMINAL ICA: 61 CM/SEC
BH CV VAS TCD RIGHT ACA: 34 CM/SEC
BH CV VAS TCD RIGHT ACA: 79 CM/SEC
BH CV VAS TCD RIGHT DISTAL M1: 75 CM/SEC
BH CV VAS TCD RIGHT DISTAL M1: 82 CM/SEC
BH CV VAS TCD RIGHT MID M1: 69 CM/SEC
BH CV VAS TCD RIGHT MID M1: 71 CM/SEC
BH CV VAS TCD RIGHT P1: 45 CM/SEC
BH CV VAS TCD RIGHT P1: 56 CM/SEC
BH CV VAS TCD RIGHT P2: 17 CM/SEC
BH CV VAS TCD RIGHT P2: 24 CM/SEC
BH CV VAS TCD RIGHT PROXIMAL M1: 62 CM/SEC
BH CV VAS TCD RIGHT PROXIMAL M1: 79 CM/SEC
BH CV VAS TCD RIGHT TERMINAL ICA: 35 CM/SEC
BH CV VAS TCD RIGHT TERMINAL ICA: 66 CM/SEC
BUN SERPL-MCNC: 9 MG/DL (ref 6–20)
BUN/CREAT SERPL: 11.4 (ref 7–25)
CALCIUM SPEC-SCNC: 8.5 MG/DL (ref 8.6–10.5)
CHLORIDE SERPL-SCNC: 98 MMOL/L (ref 98–107)
CO2 SERPL-SCNC: 27 MMOL/L (ref 22–29)
CREAT SERPL-MCNC: 0.79 MG/DL (ref 0.76–1.27)
DEPRECATED RDW RBC AUTO: 44.2 FL (ref 37–54)
EOSINOPHIL # BLD AUTO: 0.01 10*3/MM3 (ref 0–0.4)
EOSINOPHIL NFR BLD AUTO: 0.1 % (ref 0.3–6.2)
ERYTHROCYTE [DISTWIDTH] IN BLOOD BY AUTOMATED COUNT: 12.8 % (ref 12.3–15.4)
GFR SERPL CREATININE-BSD FRML MDRD: 103 ML/MIN/1.73
GLUCOSE SERPL-MCNC: 119 MG/DL (ref 65–99)
HCT VFR BLD AUTO: 37.2 % (ref 37.5–51)
HGB BLD-MCNC: 12.4 G/DL (ref 13–17.7)
IMM GRANULOCYTES # BLD AUTO: 0.05 10*3/MM3 (ref 0–0.05)
IMM GRANULOCYTES NFR BLD AUTO: 0.5 % (ref 0–0.5)
LYMPHOCYTES # BLD AUTO: 0.86 10*3/MM3 (ref 0.7–3.1)
LYMPHOCYTES NFR BLD AUTO: 8.1 % (ref 19.6–45.3)
MCH RBC QN AUTO: 31.3 PG (ref 26.6–33)
MCHC RBC AUTO-ENTMCNC: 33.3 G/DL (ref 31.5–35.7)
MCV RBC AUTO: 93.9 FL (ref 79–97)
MONOCYTES # BLD AUTO: 0.99 10*3/MM3 (ref 0.1–0.9)
MONOCYTES NFR BLD AUTO: 9.3 % (ref 5–12)
NEUTROPHILS NFR BLD AUTO: 8.74 10*3/MM3 (ref 1.7–7)
NEUTROPHILS NFR BLD AUTO: 81.8 % (ref 42.7–76)
NRBC BLD AUTO-RTO: 0 /100 WBC (ref 0–0.2)
PLATELET # BLD AUTO: 216 10*3/MM3 (ref 140–450)
PMV BLD AUTO: 9.8 FL (ref 6–12)
POTASSIUM SERPL-SCNC: 3.5 MMOL/L (ref 3.5–5.2)
POTASSIUM SERPL-SCNC: 3.7 MMOL/L (ref 3.5–5.2)
RBC # BLD AUTO: 3.96 10*6/MM3 (ref 4.14–5.8)
SODIUM SERPL-SCNC: 132 MMOL/L (ref 136–145)
SODIUM SERPL-SCNC: 132 MMOL/L (ref 136–145)
SODIUM SERPL-SCNC: 133 MMOL/L (ref 136–145)
SODIUM SERPL-SCNC: 134 MMOL/L (ref 136–145)
WBC # BLD AUTO: 10.67 10*3/MM3 (ref 3.4–10.8)

## 2020-10-16 PROCEDURE — 99231 SBSQ HOSP IP/OBS SF/LOW 25: CPT | Performed by: NEUROLOGICAL SURGERY

## 2020-10-16 PROCEDURE — 25010000002 ONDANSETRON PER 1 MG: Performed by: PHYSICIAN ASSISTANT

## 2020-10-16 PROCEDURE — 93886 INTRACRANIAL COMPLETE STUDY: CPT

## 2020-10-16 PROCEDURE — 99232 SBSQ HOSP IP/OBS MODERATE 35: CPT | Performed by: INTERNAL MEDICINE

## 2020-10-16 PROCEDURE — 71045 X-RAY EXAM CHEST 1 VIEW: CPT

## 2020-10-16 PROCEDURE — 84295 ASSAY OF SERUM SODIUM: CPT | Performed by: PHYSICIAN ASSISTANT

## 2020-10-16 PROCEDURE — 84132 ASSAY OF SERUM POTASSIUM: CPT | Performed by: INTERNAL MEDICINE

## 2020-10-16 PROCEDURE — 25010000002 HYDROMORPHONE PER 4 MG: Performed by: PHYSICIAN ASSISTANT

## 2020-10-16 PROCEDURE — 25010000002 PROMETHAZINE PER 50 MG: Performed by: PHYSICIAN ASSISTANT

## 2020-10-16 PROCEDURE — 80048 BASIC METABOLIC PNL TOTAL CA: CPT | Performed by: INTERNAL MEDICINE

## 2020-10-16 PROCEDURE — 85025 COMPLETE CBC W/AUTO DIFF WBC: CPT | Performed by: INTERNAL MEDICINE

## 2020-10-16 RX ORDER — 3% SODIUM CHLORIDE 3 G/100ML
500 INJECTION, SOLUTION INTRAVENOUS CONTINUOUS
Status: DISPENSED | OUTPATIENT
Start: 2020-10-16 | End: 2020-10-16

## 2020-10-16 RX ORDER — 3% SODIUM CHLORIDE 3 G/100ML
500 INJECTION, SOLUTION INTRAVENOUS CONTINUOUS
Status: DISCONTINUED | OUTPATIENT
Start: 2020-10-16 | End: 2020-10-17

## 2020-10-16 RX ORDER — 3% SODIUM CHLORIDE 3 G/100ML
500 INJECTION, SOLUTION INTRAVENOUS CONTINUOUS
Status: DISCONTINUED | OUTPATIENT
Start: 2020-10-16 | End: 2020-10-16

## 2020-10-16 RX ADMIN — HYDROMORPHONE HYDROCHLORIDE 0.5 MG: 1 INJECTION, SOLUTION INTRAMUSCULAR; INTRAVENOUS; SUBCUTANEOUS at 04:35

## 2020-10-16 RX ADMIN — HYDROMORPHONE HYDROCHLORIDE 0.5 MG: 1 INJECTION, SOLUTION INTRAMUSCULAR; INTRAVENOUS; SUBCUTANEOUS at 06:32

## 2020-10-16 RX ADMIN — HYDROMORPHONE HYDROCHLORIDE 0.5 MG: 1 INJECTION, SOLUTION INTRAMUSCULAR; INTRAVENOUS; SUBCUTANEOUS at 22:17

## 2020-10-16 RX ADMIN — NIMODIPINE 60 MG: 30 CAPSULE, LIQUID FILLED ORAL at 20:02

## 2020-10-16 RX ADMIN — SODIUM CHLORIDE, PRESERVATIVE FREE 10 ML: 5 INJECTION INTRAVENOUS at 20:03

## 2020-10-16 RX ADMIN — PROMETHAZINE HYDROCHLORIDE 12.5 MG: 25 INJECTION, SOLUTION INTRAMUSCULAR; INTRAVENOUS at 10:03

## 2020-10-16 RX ADMIN — HYDROMORPHONE HYDROCHLORIDE 0.5 MG: 1 INJECTION, SOLUTION INTRAMUSCULAR; INTRAVENOUS; SUBCUTANEOUS at 11:50

## 2020-10-16 RX ADMIN — ONDANSETRON HYDROCHLORIDE 4 MG: 2 SOLUTION INTRAMUSCULAR; INTRAVENOUS at 08:37

## 2020-10-16 RX ADMIN — FAMOTIDINE 20 MG: 20 TABLET ORAL at 08:26

## 2020-10-16 RX ADMIN — NIMODIPINE 60 MG: 30 CAPSULE, LIQUID FILLED ORAL at 08:26

## 2020-10-16 RX ADMIN — HYDROMORPHONE HYDROCHLORIDE 0.5 MG: 1 INJECTION, SOLUTION INTRAMUSCULAR; INTRAVENOUS; SUBCUTANEOUS at 08:38

## 2020-10-16 RX ADMIN — SODIUM CHLORIDE 500 ML: 3 INJECTION, SOLUTION INTRAVENOUS at 04:36

## 2020-10-16 RX ADMIN — POTASSIUM CHLORIDE 40 MEQ: 10 CAPSULE, COATED, EXTENDED RELEASE ORAL at 11:49

## 2020-10-16 RX ADMIN — HYDROMORPHONE HYDROCHLORIDE 0.5 MG: 1 INJECTION, SOLUTION INTRAMUSCULAR; INTRAVENOUS; SUBCUTANEOUS at 15:54

## 2020-10-16 RX ADMIN — HYDROMORPHONE HYDROCHLORIDE 0.5 MG: 1 INJECTION, SOLUTION INTRAMUSCULAR; INTRAVENOUS; SUBCUTANEOUS at 17:52

## 2020-10-16 RX ADMIN — HYDROMORPHONE HYDROCHLORIDE 0.5 MG: 1 INJECTION, SOLUTION INTRAMUSCULAR; INTRAVENOUS; SUBCUTANEOUS at 20:02

## 2020-10-16 RX ADMIN — NIMODIPINE 60 MG: 30 CAPSULE, LIQUID FILLED ORAL at 04:35

## 2020-10-16 RX ADMIN — ONDANSETRON HYDROCHLORIDE 4 MG: 2 SOLUTION INTRAMUSCULAR; INTRAVENOUS at 23:46

## 2020-10-16 RX ADMIN — HYDROCODONE BITARTRATE AND ACETAMINOPHEN 1 TABLET: 5; 325 TABLET ORAL at 23:34

## 2020-10-16 RX ADMIN — SODIUM CHLORIDE 500 ML: 3 INJECTION, SOLUTION INTRAVENOUS at 22:17

## 2020-10-16 RX ADMIN — NIMODIPINE 60 MG: 30 CAPSULE, LIQUID FILLED ORAL at 15:54

## 2020-10-16 RX ADMIN — PROMETHAZINE HYDROCHLORIDE 12.5 MG: 25 INJECTION, SOLUTION INTRAMUSCULAR; INTRAVENOUS at 20:03

## 2020-10-16 RX ADMIN — HYDROMORPHONE HYDROCHLORIDE 0.5 MG: 1 INJECTION, SOLUTION INTRAMUSCULAR; INTRAVENOUS; SUBCUTANEOUS at 13:54

## 2020-10-16 RX ADMIN — SODIUM CHLORIDE 500 ML: 3 INJECTION, SOLUTION INTRAVENOUS at 15:56

## 2020-10-16 RX ADMIN — FAMOTIDINE 20 MG: 20 TABLET ORAL at 20:02

## 2020-10-16 RX ADMIN — SODIUM CHLORIDE, PRESERVATIVE FREE 10 ML: 5 INJECTION INTRAVENOUS at 08:28

## 2020-10-16 RX ADMIN — POTASSIUM CHLORIDE 40 MEQ: 10 CAPSULE, COATED, EXTENDED RELEASE ORAL at 15:54

## 2020-10-16 RX ADMIN — HYDROMORPHONE HYDROCHLORIDE 0.5 MG: 1 INJECTION, SOLUTION INTRAMUSCULAR; INTRAVENOUS; SUBCUTANEOUS at 02:42

## 2020-10-16 RX ADMIN — ONDANSETRON HYDROCHLORIDE 4 MG: 2 SOLUTION INTRAMUSCULAR; INTRAVENOUS at 14:31

## 2020-10-16 RX ADMIN — NIMODIPINE 60 MG: 30 CAPSULE, LIQUID FILLED ORAL at 11:49

## 2020-10-16 RX ADMIN — HYDROCODONE BITARTRATE AND ACETAMINOPHEN 1 TABLET: 5; 325 TABLET ORAL at 17:51

## 2020-10-16 RX ADMIN — SODIUM CHLORIDE 500 ML: 3 INJECTION, SOLUTION INTRAVENOUS at 10:29

## 2020-10-16 RX ADMIN — TAMSULOSIN HYDROCHLORIDE 0.4 MG: 0.4 CAPSULE ORAL at 08:26

## 2020-10-16 NOTE — PROGRESS NOTES
"INTENSIVIST NOTE     Hospital Day: 5    Mr. David Dempsey, 50 y.o. male is followed for:   Subarachnoid hemorrhage, hypertension, BPH, and GERD       SUBJECTIVE     50-year-old male who developed the sudden onset of a severe headache with nausea and photophobia on 10/11.  He was transferred to our hospital after a CT scan demonstrated a subarachnoid hemorrhage.  He underwent diagnostic angiogram on 10/12 which revealed no culprit lesion for his bleed.  He has a history of a prior left frontal craniotomy in July 2019 for subdural hematoma.  Other medical problems are hypertension, BPH, GERD, and tobacco use.  Diagnostic angiogram on 10/12/2020 revealed no culprit lesions.    Interval history:    Increased headache today.  Fluid balance even.  Sodium dropped to 134.    The patient's relevant past medical, surgical and social history were reviewed and updated in Epic as appropriate.       OBJECTIVE     Vital Sign Min/Max for last 24 hours  Temp  Min: 98.3 °F (36.8 °C)  Max: 99.3 °F (37.4 °C)   BP  Min: 127/69  Max: 164/83   Pulse  Min: 50  Max: 90   Resp  Min: 16  Max: 20   SpO2  Min: 88 %  Max: 96 %   No data recorded   Weight  Min: 54 kg (119 lb)  Max: 54 kg (119 lb)      Intake/Output Summary (Last 24 hours) at 10/16/2020 1557  Last data filed at 10/16/2020 1400  Gross per 24 hour   Intake 3712.57 ml   Output 2625 ml   Net 1087.57 ml      Flowsheet Rows      First Filed Value   Admission Height  165.1 cm (65\") Documented at 10/11/2020 2029   Admission Weight  54.3 kg (119 lb 11.4 oz) Documented at 10/11/2020 2029             10/14/20  1218 10/15/20  1427 10/16/20  1234   Weight: 54 kg (119 lb) 54 kg (119 lb 0.8 oz) 54 kg (119 lb)            Objective:  General Appearance:  In no acute distress.    Vital signs: (most recent): Blood pressure 145/80, pulse 78, temperature 99.3 °F (37.4 °C), temperature source Oral, resp. rate 18, height 165.1 cm (65\"), weight 54 kg (119 lb), SpO2 92 %.    HEENT: Normal HEENT exam.  "   Lungs:  Normal effort and normal respiratory rate.  Breath sounds clear to auscultation.  He is not in respiratory distress.  No rales, wheezes or rhonchi.    Heart: Normal rate.  Regular rhythm.  S1 normal and S2 normal.  No murmur, gallop or friction rub.   Chest: Symmetric chest wall expansion.   Abdomen: Abdomen is soft and non-distended.  Bowel sounds are normal.   There is no abdominal tenderness.   There is no mass. There is no splenomegaly. There is no hepatomegaly.   Extremities: There is no deformity or dependent edema.  (Right femoral catheterization site without bleeding or hematoma)  Neurological: Patient is alert and oriented to person, place and time.    Pupils:  Pupils are equal, round, and reactive to light.    Skin:  Warm and dry.              I reviewed the patient's new clinical results.  I reviewed the patient's new imaging results/reports including actual images and agree with reports.      Chest X-Ray: No additional    INFUSIONS  sodium chloride, 500 mL, Last Rate: 500 mL (10/16/20 1029)  sodium chloride, 500 mL, Last Rate: 500 mL (10/16/20 1556)        Results from last 7 days   Lab Units 10/16/20  0652 10/14/20  0557 10/13/20  0458   WBC 10*3/mm3 10.67 11.93* 10.12   HEMOGLOBIN g/dL 12.4* 12.5* 12.4*   HEMATOCRIT % 37.2* 38.8 38.2   PLATELETS 10*3/mm3 216 197 190     Results from last 7 days   Lab Units 10/16/20  1352 10/16/20  0652 10/15/20  2314  10/15/20  0532 10/14/20  0557  10/13/20  0458 10/12/20  0424   SODIUM mmol/L 132* 133* 132*   < > 134* 141   < > 137 136   POTASSIUM mmol/L  --  3.5  --   --  3.7 4.3   < > 4.1 3.9   CHLORIDE mmol/L  --  98  --   --  100 107   < > 104 104   CO2 mmol/L  --  27.0  --   --  27.0 26.0   < > 23.0 23.0   BUN mg/dL  --  9  --   --  9 9   < > 11 9   GLUCOSE mg/dL  --  119*  --   --  88 96   < > 101* 116*   CREATININE mg/dL  --  0.79  --   --  0.73* 0.86   < > 0.76 0.82   MAGNESIUM mg/dL  --   --   --   --   --  2.0  --  1.9 2.7*   CALCIUM mg/dL  --   8.5*  --   --  8.6 8.4*   < > 8.4* 8.0*    < > = values in this interval not displayed.                 [facility] Ventilation:      I reviewed the patient's medications.    Assessment/Plan   ASSESSMENT/PLAN     Active Hospital Problems    Diagnosis   • **SAH    • H/O SDH s/p L frontal craniotomy (7/23/19)   • Tobacco use (1.5 PPD)   • HTN   • Marijuana use   • BPH   • GERD       1. Subarachnoid hemorrhage: No culprit lesion by angiogram  2. Hypertension: Relaxed blood pressure parameters per neurosurgery recommendations to treat only for SBP greater than 200 mmHg  3. Relative hyponatremia with presumed cerebral salt wasting: Hypertonic saline and frequent sodium checks  4. GERD: H2 blocker  5. BPH: Tamsulosin    1. Nimodipine  2. 3% saline  3. Serum sodium every 8 hours  4. Daily transcranial Dopplers  5. Continued ICU management     I discussed the patient's findings and my recommendations with patient and nursing staff     Plan of care and goals reviewed with multidisciplinary team at daily rounds.    High level of risk due to:  illness with threat to life or bodily function.    Trevor Ulloa MD  Pulmonary and Critical Care Medicine  10/16/20 15:57 EDT

## 2020-10-16 NOTE — PROGRESS NOTES
"Adult Nutrition  Assessment/PES    Patient Name:  David Dempsey  YOB: 1970  MRN: [id number]  Admit Date:  10/11/2020    Assessment Date:  10/16/2020    Comments:  Pt w/ sub-optimal intake r/t HA, nausea. Intake and menu adjusted to optimize intake. ONS offer declined d/t sweetness and current nausea. Encourage pt to request foods he can tolerate, staff can relay this to food svcs. RD will continue to monitor.    Reason for Assessment     Row Name 10/16/20 135          Reason for Assessment    Reason For Assessment  per organizational policy;follow-up protocol MDR, LOS review; 30 mins     Diagnosis  neurologic conditions     Identified At Risk by Screening Criteria  no indicators present         Nutrition/Diet History     Row Name 10/16/20 1356          Nutrition/Diet History    Typical Food/Fluid Intake  RN reports pt w/ HA , nausea and  poor intake. 3% hypertonic saline order probable sm vessel vasospasm, CSW.     Food Preferences  Pt like gatorade and is drinking this ; not really hungry orefers PBJ and fresh fruit at this time     Supplemental Drinks/Foods/Additives  declined     Factors Affecting Nutritional Intake  pain;nausea         Anthropometrics     Row Name 10/16/20 1234       Anthropometrics    Height  170.2 cm (67\")    Weight  54 kg (119 lb)       Admit Weight    Admit Weight  54.3 kg (119 lb 11.4 oz)       Ideal Body Weight (IBW)    Ideal Body Weight (IBW) (kg)  68.1    % Ideal Body Weight  79.27       Body Mass Index (BMI)    BMI (kg/m2)  18.68        Labs/Tests/Procedures/Meds     Row Name 10/16/20 1359          Labs/Procedures/Meds    Lab Results Reviewed  reviewed, pertinent     Lab Results Comments  Na+ 133        Diagnostic Tests/Procedures    Diagnostic Test/Procedure Reviewed  reviewed, pertinent     Diagnostic Test/Procedures Comments  daily TCDs noted per NS        Medications    Pertinent Medications Reviewed  reviewed, pertinent     Pertinent Medications Comments  " "nimodipine, 3.0% saline, IV phenergan/zofran         Physical Findings     Row Name 10/16/20 1401          Physical Findings    Overall Physical Appearance  underweight         Estimated/Assessed Needs     Row Name 10/16/20 1401          Weight Used For Calculations  54 kg (119 lb 0.8 oz)    Height  165.1 cm (65\")          Additional Documentation  KCAL/KG (Group);Protein Requirements (Group);Fluid Requirements (Group)          KCAL/KG  25 Kcal/Kg (kcal);30 Kcal/Kg (kcal)    25 Kcal/Kg (kcal)  1350    30 Kcal/Kg (kcal)  1620          Weight Used For Protein Calculations  54 kg (119 lb 0.8 oz)    Est Protein Requirement Amount (gms/kg)  1.2 gm protein    Estimated Protein Requirements (gms/day)  64.8          Fluid Requirements (mL/day)  1620    Estimated Fluid Requirement Method  RDA Method    RDA Method (mL)  1620        Nutrition Prescription Ordered     Row Name 10/16/20 1402          Nutrition Prescription PO    Current PO Diet  Regular     Fluid Consistency  Thin         Evaluation of Received Nutrient/Fluid Intake     Row Name 10/16/20 1402          PO Evaluation    Number of Days PO Intake Evaluated  Other (comment) LOS review 5 days per protocol     Number of Meals  5     % PO Intake  10 ; 2 meals NPO ; 7 meals no documentation               Problem/Interventions:  Problem 1     Row Name 10/16/20 140          Nutrition Diagnoses Problem 1    Problem 1  Predicted Suboptimal Intake     Etiology (related to)  Medical Diagnosis     Neurological  SAH     Signs/Symptoms (evidenced by)  Report of Mnimal PO Intake;PO diet not tolerated;PO Intake;Report/Observation     Percent (%) intake recorded  10 %     Over number of meals  5     Reported/Observed By  Patient HA pain , nausea     Resolved?  -- ongoing, menu adjusted to pt's tolerance               Intervention Goal     Row Name 10/16/20 1407          Intervention Goal    General  Meet nutritional needs for age/condition     PO  Tolerate PO;Increase intake     "     Nutrition Intervention     Row Name 10/16/20 1406          Nutrition Intervention    RD/Tech Action  Advise alternate selection;Interview for preference;Menu provided;Menu adjusted;Supplement offered/refused;Follow Tx progress;Care plan reviewd;Encourage intake           Education/Evaluation     Row Name 10/16/20 1407          Monitor/Evaluation    Monitor  Per protocol;PO intake;Pertinent labs;Weight;Symptoms           Electronically signed by:  Denae Sheikh MS,RD,LD  10/16/20 14:08 EDT

## 2020-10-16 NOTE — PLAN OF CARE
Goal Outcome Evaluation:  Plan of Care Reviewed With: patient  Progress: no change  Outcome Summary: VSS and afebrile tonight. He has had moderate to severe pain tonight requiring frequent dosing with Dilaudid IVP. Intermittent nausea possibly r/t pain medication and poor appetite. Serum sodium continued to decline at his 0000 draw, 3% solution increased to 60ml/hr and I have asked him to limit water intake. Neurologically intact with NIH of 0. AM labs pending including repeat Na. WCTM.

## 2020-10-16 NOTE — PROGRESS NOTES
Subjective: Hospital day 6, post bleed day 5 status post angio negative subarachnoid hemorrhage    Objective:    Vitals:    10/16/20 0826   BP: 134/71   Pulse: 72   Resp:    Temp:    SpO2:      Pulse  Av.1  Min: 46  Max: 90  Systolic (24hrs), Av , Min:123 , Max:170     Diastolic (24hrs), Av, Min:67, Max:110    Temp (24hrs), Av.5 °F (36.9 °C), Min:98.3 °F (36.8 °C), Max:99.2 °F (37.3 °C)      He is sitting up in bed.  He is complaining of moderate head pain which is reasonably well controlled with Dilaudid.  He continues to struggle with hyponatremia.  Nausea is also controlled with oral medications, however p.o. intake has been poor.    Cranial nerves II through XII are intact.  He has no pronator drift.  He is conversant and appropriate.  He is oriented to person, place, event, and year.    Lab Results   Component Value Date     (L) 10/16/2020       A/P:   Continue ICU, subarachnoid hemorrhage protocol.  His hyponatremia may indicate small vessel vasospasm which we should be able to treat medically.  Transcranial Doppler velocities remain well within normal limits.  There is no clinical or radiographic evidence of large vessel vasospasm at this time.  Continue nimodipine  Increase 3% to 75 mL/hr  Transcranial Doppler today    The pathophysiology for his hyponatremia is presumptively cerebral salt wasting, so fluid restriction is not likely to help him with his hyponatremia all that much.

## 2020-10-16 NOTE — PROGRESS NOTES
Continued Stay Note  [facility]     Patient Name: David Dempsey  MRN: [id number]  Today's Date: 10/16/2020    Admit Date: 10/11/2020    Discharge Plan     Row Name 10/16/20 1301       Plan    Plan  home    Patient/Family in Agreement with Plan  yes    Plan Comments  Met with patient and significant other at bedside to discuss discharge planning;  Plan is still home once d/c from hospital  CM following.    Final Discharge Disposition Code  01 - home or self-care        Discharge Codes    No documentation.       Expected Discharge Date and Time     Expected Discharge Date Expected Discharge Time    Oct 17, 2020             Elaine Ta RN

## 2020-10-16 NOTE — PLAN OF CARE
Goal Outcome Evaluation:  Plan of Care Reviewed With: patient, significant other  Progress: no change  Outcome Summary: NIH 0 this am. Neurologically intact. Na checks Q6h. 3% hypertonic saline infusing and titrated per neurosurgery. 1-3LNC. VSS. Strict I&O. No water per LUIS Shetty. UOP adequate. Emesis X 1. Zofran and phenergan prn for nausea and vomiting. Afebrile. Dilaudid prn for pain. Will continue to monitor.

## 2020-10-17 ENCOUNTER — APPOINTMENT (OUTPATIENT)
Dept: CARDIOLOGY | Facility: HOSPITAL | Age: 50
End: 2020-10-17

## 2020-10-17 LAB
ANION GAP SERPL CALCULATED.3IONS-SCNC: 9 MMOL/L (ref 5–15)
BH CV VAS TCD LEFT ACA: 56 CM/SEC
BH CV VAS TCD LEFT DISTAL M1: 55 CM/SEC
BH CV VAS TCD LEFT MID M1: 100 CM/SEC
BH CV VAS TCD LEFT P1: 46 CM/SEC
BH CV VAS TCD LEFT P2: 36 CM/SEC
BH CV VAS TCD LEFT PROXIMAL M1: 79 CM/SEC
BH CV VAS TCD LEFT TERMINAL ICA: 30 CM/SEC
BH CV VAS TCD RIGHT ACA: 42 CM/SEC
BH CV VAS TCD RIGHT DISTAL M1: 82 CM/SEC
BH CV VAS TCD RIGHT MID M1: 70 CM/SEC
BH CV VAS TCD RIGHT P1: 53 CM/SEC
BH CV VAS TCD RIGHT P2: 46 CM/SEC
BH CV VAS TCD RIGHT PROXIMAL M1: 86 CM/SEC
BH CV VAS TCD RIGHT TERMINAL ICA: 26 CM/SEC
BUN SERPL-MCNC: 7 MG/DL (ref 6–20)
BUN/CREAT SERPL: 9.1 (ref 7–25)
CALCIUM SPEC-SCNC: 8.1 MG/DL (ref 8.6–10.5)
CHLORIDE SERPL-SCNC: 104 MMOL/L (ref 98–107)
CO2 SERPL-SCNC: 24 MMOL/L (ref 22–29)
CREAT SERPL-MCNC: 0.77 MG/DL (ref 0.76–1.27)
D-LACTATE SERPL-SCNC: 1.3 MMOL/L (ref 0.5–2)
DEPRECATED RDW RBC AUTO: 45.2 FL (ref 37–54)
ERYTHROCYTE [DISTWIDTH] IN BLOOD BY AUTOMATED COUNT: 13 % (ref 12.3–15.4)
GFR SERPL CREATININE-BSD FRML MDRD: 106 ML/MIN/1.73
GLUCOSE SERPL-MCNC: 149 MG/DL (ref 65–99)
HCT VFR BLD AUTO: 36.1 % (ref 37.5–51)
HGB BLD-MCNC: 11.9 G/DL (ref 13–17.7)
MCH RBC QN AUTO: 31.1 PG (ref 26.6–33)
MCHC RBC AUTO-ENTMCNC: 33 G/DL (ref 31.5–35.7)
MCV RBC AUTO: 94.3 FL (ref 79–97)
PLATELET # BLD AUTO: 201 10*3/MM3 (ref 140–450)
PMV BLD AUTO: 10.1 FL (ref 6–12)
POTASSIUM SERPL-SCNC: 3.5 MMOL/L (ref 3.5–5.2)
POTASSIUM SERPL-SCNC: 3.9 MMOL/L (ref 3.5–5.2)
PROCALCITONIN SERPL-MCNC: 0.06 NG/ML (ref 0–0.25)
RBC # BLD AUTO: 3.83 10*6/MM3 (ref 4.14–5.8)
SODIUM SERPL-SCNC: 137 MMOL/L (ref 136–145)
SODIUM SERPL-SCNC: 138 MMOL/L (ref 136–145)
SODIUM SERPL-SCNC: 139 MMOL/L (ref 136–145)
SODIUM SERPL-SCNC: 142 MMOL/L (ref 136–145)
WBC # BLD AUTO: 11.03 10*3/MM3 (ref 3.4–10.8)

## 2020-10-17 PROCEDURE — 84295 ASSAY OF SERUM SODIUM: CPT | Performed by: PHYSICIAN ASSISTANT

## 2020-10-17 PROCEDURE — 80048 BASIC METABOLIC PNL TOTAL CA: CPT | Performed by: INTERNAL MEDICINE

## 2020-10-17 PROCEDURE — 83605 ASSAY OF LACTIC ACID: CPT | Performed by: NURSE PRACTITIONER

## 2020-10-17 PROCEDURE — 85027 COMPLETE CBC AUTOMATED: CPT | Performed by: NURSE PRACTITIONER

## 2020-10-17 PROCEDURE — 84132 ASSAY OF SERUM POTASSIUM: CPT | Performed by: INTERNAL MEDICINE

## 2020-10-17 PROCEDURE — 25010000002 ONDANSETRON PER 1 MG: Performed by: PHYSICIAN ASSISTANT

## 2020-10-17 PROCEDURE — 94799 UNLISTED PULMONARY SVC/PX: CPT

## 2020-10-17 PROCEDURE — 25010000002 HYDROMORPHONE PER 4 MG: Performed by: PHYSICIAN ASSISTANT

## 2020-10-17 PROCEDURE — 84145 PROCALCITONIN (PCT): CPT | Performed by: NURSE PRACTITIONER

## 2020-10-17 PROCEDURE — 87205 SMEAR GRAM STAIN: CPT | Performed by: NURSE PRACTITIONER

## 2020-10-17 PROCEDURE — 87070 CULTURE OTHR SPECIMN AEROBIC: CPT | Performed by: NURSE PRACTITIONER

## 2020-10-17 PROCEDURE — 87040 BLOOD CULTURE FOR BACTERIA: CPT | Performed by: NURSE PRACTITIONER

## 2020-10-17 PROCEDURE — 93886 INTRACRANIAL COMPLETE STUDY: CPT

## 2020-10-17 PROCEDURE — 25010000002 PROMETHAZINE PER 50 MG: Performed by: PHYSICIAN ASSISTANT

## 2020-10-17 PROCEDURE — 99232 SBSQ HOSP IP/OBS MODERATE 35: CPT | Performed by: INTERNAL MEDICINE

## 2020-10-17 RX ORDER — AMOXICILLIN 250 MG
2 CAPSULE ORAL 2 TIMES DAILY PRN
Status: DISCONTINUED | OUTPATIENT
Start: 2020-10-17 | End: 2020-10-21 | Stop reason: HOSPADM

## 2020-10-17 RX ORDER — 3% SODIUM CHLORIDE 3 G/100ML
500 INJECTION, SOLUTION INTRAVENOUS CONTINUOUS
Status: DISCONTINUED | OUTPATIENT
Start: 2020-10-17 | End: 2020-10-17

## 2020-10-17 RX ORDER — 3% SODIUM CHLORIDE 3 G/100ML
500 INJECTION, SOLUTION INTRAVENOUS CONTINUOUS
Status: DISCONTINUED | OUTPATIENT
Start: 2020-10-17 | End: 2020-10-18

## 2020-10-17 RX ADMIN — ONDANSETRON HYDROCHLORIDE 4 MG: 2 SOLUTION INTRAMUSCULAR; INTRAVENOUS at 21:20

## 2020-10-17 RX ADMIN — HYDROMORPHONE HYDROCHLORIDE 0.5 MG: 1 INJECTION, SOLUTION INTRAMUSCULAR; INTRAVENOUS; SUBCUTANEOUS at 17:17

## 2020-10-17 RX ADMIN — HYDROMORPHONE HYDROCHLORIDE 0.5 MG: 1 INJECTION, SOLUTION INTRAMUSCULAR; INTRAVENOUS; SUBCUTANEOUS at 21:20

## 2020-10-17 RX ADMIN — HYDROMORPHONE HYDROCHLORIDE 0.5 MG: 1 INJECTION, SOLUTION INTRAMUSCULAR; INTRAVENOUS; SUBCUTANEOUS at 06:21

## 2020-10-17 RX ADMIN — NIMODIPINE 60 MG: 30 CAPSULE, LIQUID FILLED ORAL at 23:56

## 2020-10-17 RX ADMIN — HYDROMORPHONE HYDROCHLORIDE 0.5 MG: 1 INJECTION, SOLUTION INTRAMUSCULAR; INTRAVENOUS; SUBCUTANEOUS at 11:54

## 2020-10-17 RX ADMIN — HYDROMORPHONE HYDROCHLORIDE 0.5 MG: 1 INJECTION, SOLUTION INTRAMUSCULAR; INTRAVENOUS; SUBCUTANEOUS at 08:18

## 2020-10-17 RX ADMIN — HYDROMORPHONE HYDROCHLORIDE 0.5 MG: 1 INJECTION, SOLUTION INTRAMUSCULAR; INTRAVENOUS; SUBCUTANEOUS at 00:20

## 2020-10-17 RX ADMIN — POTASSIUM CHLORIDE 40 MEQ: 10 CAPSULE, COATED, EXTENDED RELEASE ORAL at 08:18

## 2020-10-17 RX ADMIN — SODIUM CHLORIDE 500 ML: 3 INJECTION, SOLUTION INTRAVENOUS at 18:40

## 2020-10-17 RX ADMIN — HYDROCODONE BITARTRATE AND ACETAMINOPHEN 1 TABLET: 5; 325 TABLET ORAL at 19:31

## 2020-10-17 RX ADMIN — NIMODIPINE 60 MG: 30 CAPSULE, LIQUID FILLED ORAL at 11:54

## 2020-10-17 RX ADMIN — ONDANSETRON HYDROCHLORIDE 4 MG: 2 SOLUTION INTRAMUSCULAR; INTRAVENOUS at 14:33

## 2020-10-17 RX ADMIN — NIMODIPINE 60 MG: 30 CAPSULE, LIQUID FILLED ORAL at 19:31

## 2020-10-17 RX ADMIN — HYDROCODONE BITARTRATE AND ACETAMINOPHEN 1 TABLET: 5; 325 TABLET ORAL at 11:54

## 2020-10-17 RX ADMIN — SODIUM CHLORIDE, PRESERVATIVE FREE 10 ML: 5 INJECTION INTRAVENOUS at 08:22

## 2020-10-17 RX ADMIN — ACETAMINOPHEN 1000 MG: 500 TABLET, FILM COATED ORAL at 00:20

## 2020-10-17 RX ADMIN — TAMSULOSIN HYDROCHLORIDE 0.4 MG: 0.4 CAPSULE ORAL at 08:18

## 2020-10-17 RX ADMIN — ONDANSETRON HYDROCHLORIDE 4 MG: 2 SOLUTION INTRAMUSCULAR; INTRAVENOUS at 08:18

## 2020-10-17 RX ADMIN — ACETAMINOPHEN 1000 MG: 500 TABLET, FILM COATED ORAL at 19:35

## 2020-10-17 RX ADMIN — NIMODIPINE 60 MG: 30 CAPSULE, LIQUID FILLED ORAL at 16:06

## 2020-10-17 RX ADMIN — SODIUM CHLORIDE 500 ML: 3 INJECTION, SOLUTION INTRAVENOUS at 03:23

## 2020-10-17 RX ADMIN — SODIUM CHLORIDE 500 ML: 3 INJECTION, SOLUTION INTRAVENOUS at 23:18

## 2020-10-17 RX ADMIN — HYDROMORPHONE HYDROCHLORIDE 0.5 MG: 1 INJECTION, SOLUTION INTRAMUSCULAR; INTRAVENOUS; SUBCUTANEOUS at 23:56

## 2020-10-17 RX ADMIN — SODIUM CHLORIDE 500 ML: 3 INJECTION, SOLUTION INTRAVENOUS at 08:38

## 2020-10-17 RX ADMIN — FAMOTIDINE 20 MG: 20 TABLET ORAL at 08:18

## 2020-10-17 RX ADMIN — HYDROMORPHONE HYDROCHLORIDE 0.5 MG: 1 INJECTION, SOLUTION INTRAMUSCULAR; INTRAVENOUS; SUBCUTANEOUS at 14:33

## 2020-10-17 RX ADMIN — POTASSIUM CHLORIDE 40 MEQ: 10 CAPSULE, COATED, EXTENDED RELEASE ORAL at 11:54

## 2020-10-17 RX ADMIN — HYDROMORPHONE HYDROCHLORIDE 0.5 MG: 1 INJECTION, SOLUTION INTRAMUSCULAR; INTRAVENOUS; SUBCUTANEOUS at 04:15

## 2020-10-17 RX ADMIN — SODIUM CHLORIDE, PRESERVATIVE FREE 10 ML: 5 INJECTION INTRAVENOUS at 20:14

## 2020-10-17 RX ADMIN — NIMODIPINE 60 MG: 30 CAPSULE, LIQUID FILLED ORAL at 08:18

## 2020-10-17 RX ADMIN — PROMETHAZINE HYDROCHLORIDE 12.5 MG: 25 INJECTION, SOLUTION INTRAMUSCULAR; INTRAVENOUS at 17:48

## 2020-10-17 RX ADMIN — FAMOTIDINE 20 MG: 20 TABLET ORAL at 20:14

## 2020-10-17 RX ADMIN — PROMETHAZINE HYDROCHLORIDE 12.5 MG: 25 INJECTION, SOLUTION INTRAMUSCULAR; INTRAVENOUS at 10:45

## 2020-10-17 RX ADMIN — NIMODIPINE 60 MG: 30 CAPSULE, LIQUID FILLED ORAL at 04:15

## 2020-10-17 RX ADMIN — HYDROMORPHONE HYDROCHLORIDE 0.5 MG: 1 INJECTION, SOLUTION INTRAMUSCULAR; INTRAVENOUS; SUBCUTANEOUS at 02:18

## 2020-10-17 RX ADMIN — NIMODIPINE 60 MG: 30 CAPSULE, LIQUID FILLED ORAL at 00:19

## 2020-10-17 NOTE — PLAN OF CARE
Goal Outcome Evaluation:  Plan of Care Reviewed With: patient  Progress: no change  Outcome Summary: NIH 0 this am. Neurologically intact. C/o headache and N/V. Pt. refusing to eat. Educated on the need to eat due to getting frequent pain medications and continuous nausea and vomiting. Q6h Na checks. Neurosurgery paged this am due to Na increased from 137 to 142 within a 6 hour time period. Spoke with LUIS Espitia. Hypertonic saline decreased from 100ml/hr to 50ml/hr. 4L NC. BP stable. Strict I&O. UOP adequate. Emesis X1. Zofran and phenergan prn for N/V. Tmax 99.7. Frequent, productive cough. Pt. encouraged to use IS which is at bedside. Norco and dilaudid prn for c/o pain. Potassium replaced, awaiting recheck. Will continue to monior.

## 2020-10-17 NOTE — PROGRESS NOTES
"INTENSIVIST NOTE     Hospital Day: 6    Mr. David Dempsey, 50 y.o. male is followed for:   Subarachnoid hemorrhage, hypertension, BPH, and GERD       SUBJECTIVE     50-year-old male who developed the sudden onset of a severe headache with nausea and photophobia on 10/11.  He was transferred to our hospital after a CT scan demonstrated a subarachnoid hemorrhage.  He underwent diagnostic angiogram on 10/12 which revealed no culprit lesion for his bleed.  He has a history of a prior left frontal craniotomy in July 2019 for subdural hematoma.  Other medical problems are hypertension, BPH, GERD, and tobacco use.  Diagnostic angiogram on 10/12/2020 revealed no culprit lesions.    Interval history:    Still complaining of headache.  Maximum temperature 102 degrees.  Fluid balance +1.7 L.  Latest sodium 142.    The patient's relevant past medical, surgical and social history were reviewed and updated in Epic as appropriate.       OBJECTIVE     Vital Sign Min/Max for last 24 hours  Temp  Min: 97.8 °F (36.6 °C)  Max: 102 °F (38.9 °C)   BP  Min: 116/59  Max: 164/74   Pulse  Min: 60  Max: 98   Resp  Min: 18  Max: 22   SpO2  Min: 83 %  Max: 99 %   No data recorded   Weight  Min: 54 kg (119 lb)  Max: 54 kg (119 lb)      Intake/Output Summary (Last 24 hours) at 10/17/2020 1621  Last data filed at 10/17/2020 1400  Gross per 24 hour   Intake 4581.96 ml   Output 2925 ml   Net 1656.96 ml      Flowsheet Rows      First Filed Value   Admission Height  165.1 cm (65\") Documented at 10/11/2020 2029   Admission Weight  54.3 kg (119 lb 11.4 oz) Documented at 10/11/2020 2029             10/15/20  1427 10/16/20  1234 10/17/20  1202   Weight: 54 kg (119 lb 0.8 oz) 54 kg (119 lb) 54 kg (119 lb)            Objective:  General Appearance:  In no acute distress.    Vital signs: (most recent): Blood pressure 142/78, pulse 74, temperature 99.7 °F (37.6 °C), temperature source Oral, resp. rate 20, height 165.1 cm (65\"), weight 54 kg (119 lb), SpO2 91 " %.    HEENT: Normal HEENT exam.    Lungs:  Normal effort and normal respiratory rate.  Breath sounds clear to auscultation.  He is not in respiratory distress.  No rales, wheezes or rhonchi.    Heart: Normal rate.  Regular rhythm.  S1 normal and S2 normal.  No murmur, gallop or friction rub.   Chest: Symmetric chest wall expansion.   Abdomen: Abdomen is soft and non-distended.  Bowel sounds are normal.   There is no abdominal tenderness.   There is no mass. There is no splenomegaly. There is no hepatomegaly.   Extremities: There is no deformity or dependent edema.  (Right femoral catheterization site without bleeding or hematoma)  Neurological: Patient is alert and oriented to person, place and time.    Pupils:  Pupils are equal, round, and reactive to light.    Skin:  Warm and dry.              I reviewed the patient's new clinical results.  I reviewed the patient's new imaging results/reports including actual images and agree with reports.      Chest X-Ray: Interstitial prominence.  Radiologist reports possible pneumonia right upper lobe though I do not appreciate this.    INFUSIONS  sodium chloride, 500 mL, Last Rate: 500 mL (10/17/20 0838)        Results from last 7 days   Lab Units 10/17/20  0044 10/16/20  0652 10/14/20  0557   WBC 10*3/mm3 11.03* 10.67 11.93*   HEMOGLOBIN g/dL 11.9* 12.4* 12.5*   HEMATOCRIT % 36.1* 37.2* 38.8   PLATELETS 10*3/mm3 201 216 197     Results from last 7 days   Lab Units 10/17/20  1128 10/17/20  0612 10/17/20  0044 10/16/20  1854  10/16/20  0652  10/15/20  0532 10/14/20  0557  10/13/20  0458 10/12/20  0424   SODIUM mmol/L 142  142 142 138  137 134*   < > 133*   < > 134* 141   < > 137 136   POTASSIUM mmol/L  --   --  3.5 3.7  --  3.5  --  3.7 4.3   < > 4.1 3.9   CHLORIDE mmol/L  --   --  104  --   --  98  --  100 107   < > 104 104   CO2 mmol/L  --   --  24.0  --   --  27.0  --  27.0 26.0   < > 23.0 23.0   BUN mg/dL  --   --  7  --   --  9  --  9 9   < > 11 9   GLUCOSE mg/dL  --    --  149*  --   --  119*  --  88 96   < > 101* 116*   CREATININE mg/dL  --   --  0.77  --   --  0.79  --  0.73* 0.86   < > 0.76 0.82   MAGNESIUM mg/dL  --   --   --   --   --   --   --   --  2.0  --  1.9 2.7*   CALCIUM mg/dL  --   --  8.1*  --   --  8.5*  --  8.6 8.4*   < > 8.4* 8.0*    < > = values in this interval not displayed.                 [facility] Ventilation:      I reviewed the patient's medications.    Assessment/Plan   ASSESSMENT/PLAN     Active Hospital Problems    Diagnosis   • **SAH    • H/O SDH s/p L frontal craniotomy (7/23/19)   • Tobacco use (1.5 PPD)   • HTN   • Marijuana use   • BPH   • GERD       1. Subarachnoid hemorrhage: No culprit lesion by angiogram  2. Hypertension: Relaxed blood pressure parameters per neurosurgery recommendations to treat only for SBP greater than 200 mmHg  3. Relative hyponatremia with presumed cerebral salt wasting: Hypertonic saline and frequent sodium checks  4. GERD: H2 blocker  5. BPH: Tamsulosin  6. Fever: Monitor closely and cultures performed.  Hold off on antibiotics as no apparent infection.  Could be central in nature.    1. Nimodipine  2. 3% saline  3. Serum sodium every 6 hours  4. Daily transcranial Dopplers  5. Monitor fever curve  6. Continued ICU management     I discussed the patient's findings and my recommendations with patient and nursing staff     Plan of care and goals reviewed with multidisciplinary team at daily rounds.    High level of risk due to:  illness with threat to life or bodily function.    Trevor Ulloa MD  Pulmonary and Critical Care Medicine  10/17/20 16:21 EDT

## 2020-10-17 NOTE — NURSING NOTE
Patient with febrile temperatures a couple of times since start of shift, TMAX at 2338 102. He has also had a more productive and frequent cough tonight with thick yellow sputum. He has been slightly noncompliant with lightweight clothing/bedding and placement of ice packs which has severely limited fever reduction interventions. Neurologically intact and without any specific changes other than new onset temp, cough, and restlessness. He has been on the phone quite a bit tonight and watching TV quite loudly. I have now asked him to completely turn off all devices and try to rest to assist with pain control and relaxation. After about 30 minutes, patient was noted to be asleep and quite improved. Did discuss these changes with intensivist team who ordered CXR, blood and respiratory cultures, and additional labs. CXR demonstrated potential pulmonary edema vs pneumonia but lactic, procal, and other labs were not suggestive of infectious process. Thought was given for diuresis but due to recent gómez with sodium depletion in the presence of SAH, decision was made to treat conservatively. Currently patient with VSS and temp has reduce to acceptable range (99.4). WCTM.

## 2020-10-18 ENCOUNTER — APPOINTMENT (OUTPATIENT)
Dept: CARDIOLOGY | Facility: HOSPITAL | Age: 50
End: 2020-10-18

## 2020-10-18 ENCOUNTER — APPOINTMENT (OUTPATIENT)
Dept: GENERAL RADIOLOGY | Facility: HOSPITAL | Age: 50
End: 2020-10-18

## 2020-10-18 LAB
ANION GAP SERPL CALCULATED.3IONS-SCNC: 9 MMOL/L (ref 5–15)
BASOPHILS # BLD AUTO: 0.02 10*3/MM3 (ref 0–0.2)
BASOPHILS NFR BLD AUTO: 0.2 % (ref 0–1.5)
BH CV ECHO MEAS - BSA(HAYCOCK): 1.7 M^2
BH CV ECHO MEAS - BSA: 1.7 M^2
BH CV ECHO MEAS - BZI_BMI: 22.3 KILOGRAMS/M^2
BH CV ECHO MEAS - BZI_METRIC_HEIGHT: 165.1 CM
BH CV ECHO MEAS - BZI_METRIC_WEIGHT: 60.8 KG
BH CV VAS TCD LEFT ACA: 66 CM/SEC
BH CV VAS TCD LEFT ACA: 72 CM/SEC
BH CV VAS TCD LEFT DISTAL M1: 42 CM/SEC
BH CV VAS TCD LEFT DISTAL M1: 81 CM/SEC
BH CV VAS TCD LEFT MID M1: 107 CM/SEC
BH CV VAS TCD LEFT MID M1: 46 CM/SEC
BH CV VAS TCD LEFT P1: 43 CM/SEC
BH CV VAS TCD LEFT P1: 60 CM/SEC
BH CV VAS TCD LEFT P2: 29 CM/SEC
BH CV VAS TCD LEFT P2: 43 CM/SEC
BH CV VAS TCD LEFT PROXIMAL M1: 63 CM/SEC
BH CV VAS TCD LEFT PROXIMAL M1: 83 CM/SEC
BH CV VAS TCD LEFT TERMINAL ICA: 31 CM/SEC
BH CV VAS TCD LEFT TERMINAL ICA: 39 CM/SEC
BH CV VAS TCD RIGHT ACA: 43 CM/SEC
BH CV VAS TCD RIGHT ACA: 69 CM/SEC
BH CV VAS TCD RIGHT DISTAL M1: 115 CM/SEC
BH CV VAS TCD RIGHT DISTAL M1: 68 CM/SEC
BH CV VAS TCD RIGHT MID M1: 63 CM/SEC
BH CV VAS TCD RIGHT MID M1: 99 CM/SEC
BH CV VAS TCD RIGHT P1: 50 CM/SEC
BH CV VAS TCD RIGHT P1: 62 CM/SEC
BH CV VAS TCD RIGHT P2: 19 CM/SEC
BH CV VAS TCD RIGHT P2: 37 CM/SEC
BH CV VAS TCD RIGHT PROXIMAL M1: 64 CM/SEC
BH CV VAS TCD RIGHT PROXIMAL M1: 90 CM/SEC
BH CV VAS TCD RIGHT TERMINAL ICA: 27 CM/SEC
BH CV VAS TCD RIGHT TERMINAL ICA: 58 CM/SEC
BH CV XLRA MEAS LEFT VERTEBRAL A PSV: 35 CM/SEC
BH CV XLRA MEAS RIGHT VERTEBRAL A PSV: 22 CM/SEC
BUN SERPL-MCNC: 7 MG/DL (ref 6–20)
BUN/CREAT SERPL: 8.2 (ref 7–25)
CALCIUM SPEC-SCNC: 8.7 MG/DL (ref 8.6–10.5)
CHLORIDE SERPL-SCNC: 107 MMOL/L (ref 98–107)
CO2 SERPL-SCNC: 27 MMOL/L (ref 22–29)
CREAT SERPL-MCNC: 0.85 MG/DL (ref 0.76–1.27)
DEPRECATED RDW RBC AUTO: 48.2 FL (ref 37–54)
EOSINOPHIL # BLD AUTO: 0.01 10*3/MM3 (ref 0–0.4)
EOSINOPHIL NFR BLD AUTO: 0.1 % (ref 0.3–6.2)
ERYTHROCYTE [DISTWIDTH] IN BLOOD BY AUTOMATED COUNT: 13.5 % (ref 12.3–15.4)
GFR SERPL CREATININE-BSD FRML MDRD: 95 ML/MIN/1.73
GLUCOSE SERPL-MCNC: 145 MG/DL (ref 65–99)
HCT VFR BLD AUTO: 36.8 % (ref 37.5–51)
HGB BLD-MCNC: 11.9 G/DL (ref 13–17.7)
IMM GRANULOCYTES # BLD AUTO: 0.06 10*3/MM3 (ref 0–0.05)
IMM GRANULOCYTES NFR BLD AUTO: 0.5 % (ref 0–0.5)
LYMPHOCYTES # BLD AUTO: 0.58 10*3/MM3 (ref 0.7–3.1)
LYMPHOCYTES NFR BLD AUTO: 5.2 % (ref 19.6–45.3)
MCH RBC QN AUTO: 31.4 PG (ref 26.6–33)
MCHC RBC AUTO-ENTMCNC: 32.3 G/DL (ref 31.5–35.7)
MCV RBC AUTO: 97.1 FL (ref 79–97)
MONOCYTES # BLD AUTO: 0.81 10*3/MM3 (ref 0.1–0.9)
MONOCYTES NFR BLD AUTO: 7.2 % (ref 5–12)
MRSA DNA SPEC QL NAA+PROBE: NEGATIVE
NEUTROPHILS NFR BLD AUTO: 86.8 % (ref 42.7–76)
NEUTROPHILS NFR BLD AUTO: 9.75 10*3/MM3 (ref 1.7–7)
NRBC BLD AUTO-RTO: 0 /100 WBC (ref 0–0.2)
PLATELET # BLD AUTO: 223 10*3/MM3 (ref 140–450)
PMV BLD AUTO: 10 FL (ref 6–12)
POTASSIUM SERPL-SCNC: 4 MMOL/L (ref 3.5–5.2)
RBC # BLD AUTO: 3.79 10*6/MM3 (ref 4.14–5.8)
SODIUM SERPL-SCNC: 142 MMOL/L (ref 136–145)
SODIUM SERPL-SCNC: 143 MMOL/L (ref 136–145)
SODIUM SERPL-SCNC: 143 MMOL/L (ref 136–145)
SODIUM SERPL-SCNC: 146 MMOL/L (ref 136–145)
WBC # BLD AUTO: 11.23 10*3/MM3 (ref 3.4–10.8)

## 2020-10-18 PROCEDURE — 71045 X-RAY EXAM CHEST 1 VIEW: CPT

## 2020-10-18 PROCEDURE — 93886 INTRACRANIAL COMPLETE STUDY: CPT

## 2020-10-18 PROCEDURE — 94799 UNLISTED PULMONARY SVC/PX: CPT

## 2020-10-18 PROCEDURE — 87150 DNA/RNA AMPLIFIED PROBE: CPT | Performed by: INTERNAL MEDICINE

## 2020-10-18 PROCEDURE — 87147 CULTURE TYPE IMMUNOLOGIC: CPT | Performed by: INTERNAL MEDICINE

## 2020-10-18 PROCEDURE — 99232 SBSQ HOSP IP/OBS MODERATE 35: CPT | Performed by: INTERNAL MEDICINE

## 2020-10-18 PROCEDURE — 25010000002 VANCOMYCIN 10 G RECONSTITUTED SOLUTION: Performed by: INTERNAL MEDICINE

## 2020-10-18 PROCEDURE — 87040 BLOOD CULTURE FOR BACTERIA: CPT | Performed by: INTERNAL MEDICINE

## 2020-10-18 PROCEDURE — 87641 MR-STAPH DNA AMP PROBE: CPT

## 2020-10-18 PROCEDURE — 84295 ASSAY OF SERUM SODIUM: CPT | Performed by: PHYSICIAN ASSISTANT

## 2020-10-18 PROCEDURE — 25010000002 ONDANSETRON PER 1 MG: Performed by: PHYSICIAN ASSISTANT

## 2020-10-18 PROCEDURE — 25010000002 HYDROMORPHONE PER 4 MG: Performed by: PHYSICIAN ASSISTANT

## 2020-10-18 PROCEDURE — 85025 COMPLETE CBC W/AUTO DIFF WBC: CPT | Performed by: INTERNAL MEDICINE

## 2020-10-18 PROCEDURE — 80048 BASIC METABOLIC PNL TOTAL CA: CPT | Performed by: INTERNAL MEDICINE

## 2020-10-18 PROCEDURE — 25010000002 PROMETHAZINE PER 50 MG: Performed by: PHYSICIAN ASSISTANT

## 2020-10-18 PROCEDURE — 25010000002 HYDROMORPHONE PER 4 MG: Performed by: INTERNAL MEDICINE

## 2020-10-18 RX ORDER — 3% SODIUM CHLORIDE 3 G/100ML
500 INJECTION, SOLUTION INTRAVENOUS CONTINUOUS
Status: DISCONTINUED | OUTPATIENT
Start: 2020-10-18 | End: 2020-10-19

## 2020-10-18 RX ORDER — VANCOMYCIN HYDROCHLORIDE 1 G/200ML
15 INJECTION, SOLUTION INTRAVENOUS EVERY 12 HOURS SCHEDULED
Status: DISCONTINUED | OUTPATIENT
Start: 2020-10-19 | End: 2020-10-19

## 2020-10-18 RX ADMIN — ONDANSETRON HYDROCHLORIDE 4 MG: 2 SOLUTION INTRAMUSCULAR; INTRAVENOUS at 06:05

## 2020-10-18 RX ADMIN — ONDANSETRON HYDROCHLORIDE 4 MG: 2 SOLUTION INTRAMUSCULAR; INTRAVENOUS at 19:59

## 2020-10-18 RX ADMIN — HYDROMORPHONE HYDROCHLORIDE 0.5 MG: 1 INJECTION, SOLUTION INTRAMUSCULAR; INTRAVENOUS; SUBCUTANEOUS at 12:32

## 2020-10-18 RX ADMIN — TAMSULOSIN HYDROCHLORIDE 0.4 MG: 0.4 CAPSULE ORAL at 08:13

## 2020-10-18 RX ADMIN — ONDANSETRON HYDROCHLORIDE 4 MG: 2 SOLUTION INTRAMUSCULAR; INTRAVENOUS at 12:31

## 2020-10-18 RX ADMIN — HYDROCODONE BITARTRATE AND ACETAMINOPHEN 1 TABLET: 5; 325 TABLET ORAL at 02:06

## 2020-10-18 RX ADMIN — HYDROCODONE BITARTRATE AND ACETAMINOPHEN 1 TABLET: 5; 325 TABLET ORAL at 22:03

## 2020-10-18 RX ADMIN — HYDROMORPHONE HYDROCHLORIDE 0.5 MG: 1 INJECTION, SOLUTION INTRAMUSCULAR; INTRAVENOUS; SUBCUTANEOUS at 14:33

## 2020-10-18 RX ADMIN — NIMODIPINE 60 MG: 30 CAPSULE, LIQUID FILLED ORAL at 08:13

## 2020-10-18 RX ADMIN — PROMETHAZINE HYDROCHLORIDE 12.5 MG: 25 INJECTION, SOLUTION INTRAMUSCULAR; INTRAVENOUS at 16:45

## 2020-10-18 RX ADMIN — HYDROMORPHONE HYDROCHLORIDE 0.5 MG: 1 INJECTION, SOLUTION INTRAMUSCULAR; INTRAVENOUS; SUBCUTANEOUS at 04:02

## 2020-10-18 RX ADMIN — HYDROCODONE BITARTRATE AND ACETAMINOPHEN 1 TABLET: 5; 325 TABLET ORAL at 08:12

## 2020-10-18 RX ADMIN — HYDROMORPHONE HYDROCHLORIDE 0.5 MG: 1 INJECTION, SOLUTION INTRAMUSCULAR; INTRAVENOUS; SUBCUTANEOUS at 23:23

## 2020-10-18 RX ADMIN — NIMODIPINE 60 MG: 30 CAPSULE, LIQUID FILLED ORAL at 19:59

## 2020-10-18 RX ADMIN — HYDROMORPHONE HYDROCHLORIDE 0.5 MG: 1 INJECTION, SOLUTION INTRAMUSCULAR; INTRAVENOUS; SUBCUTANEOUS at 10:13

## 2020-10-18 RX ADMIN — NIMODIPINE 60 MG: 30 CAPSULE, LIQUID FILLED ORAL at 17:17

## 2020-10-18 RX ADMIN — NIMODIPINE 60 MG: 30 CAPSULE, LIQUID FILLED ORAL at 04:02

## 2020-10-18 RX ADMIN — NIMODIPINE 60 MG: 30 CAPSULE, LIQUID FILLED ORAL at 12:32

## 2020-10-18 RX ADMIN — VANCOMYCIN HYDROCHLORIDE 1250 MG: 10 INJECTION, POWDER, LYOPHILIZED, FOR SOLUTION INTRAVENOUS at 16:37

## 2020-10-18 RX ADMIN — SODIUM CHLORIDE, PRESERVATIVE FREE 10 ML: 5 INJECTION INTRAVENOUS at 20:00

## 2020-10-18 RX ADMIN — AZTREONAM 2 G: 2 INJECTION, POWDER, LYOPHILIZED, FOR SOLUTION INTRAMUSCULAR; INTRAVENOUS at 16:37

## 2020-10-18 RX ADMIN — SODIUM CHLORIDE 500 ML: 3 INJECTION, SOLUTION INTRAVENOUS at 04:21

## 2020-10-18 RX ADMIN — ACETAMINOPHEN 1000 MG: 500 TABLET, FILM COATED ORAL at 12:46

## 2020-10-18 RX ADMIN — NIMODIPINE 60 MG: 30 CAPSULE, LIQUID FILLED ORAL at 23:23

## 2020-10-18 RX ADMIN — HYDROMORPHONE HYDROCHLORIDE 0.5 MG: 1 INJECTION, SOLUTION INTRAMUSCULAR; INTRAVENOUS; SUBCUTANEOUS at 19:59

## 2020-10-18 RX ADMIN — PROMETHAZINE HYDROCHLORIDE 12.5 MG: 25 INJECTION, SOLUTION INTRAMUSCULAR; INTRAVENOUS at 23:23

## 2020-10-18 RX ADMIN — FAMOTIDINE 20 MG: 20 TABLET ORAL at 20:00

## 2020-10-18 RX ADMIN — HYDROMORPHONE HYDROCHLORIDE 0.5 MG: 1 INJECTION, SOLUTION INTRAMUSCULAR; INTRAVENOUS; SUBCUTANEOUS at 16:37

## 2020-10-18 RX ADMIN — SODIUM CHLORIDE 500 ML: 3 INJECTION, SOLUTION INTRAVENOUS at 18:14

## 2020-10-18 RX ADMIN — ACETAMINOPHEN 1000 MG: 500 TABLET, FILM COATED ORAL at 04:21

## 2020-10-18 RX ADMIN — FAMOTIDINE 20 MG: 20 TABLET ORAL at 08:12

## 2020-10-18 RX ADMIN — HYDROMORPHONE HYDROCHLORIDE 0.5 MG: 1 INJECTION, SOLUTION INTRAMUSCULAR; INTRAVENOUS; SUBCUTANEOUS at 08:13

## 2020-10-18 RX ADMIN — HYDROMORPHONE HYDROCHLORIDE 0.5 MG: 1 INJECTION, SOLUTION INTRAMUSCULAR; INTRAVENOUS; SUBCUTANEOUS at 06:05

## 2020-10-18 RX ADMIN — SODIUM CHLORIDE 500 ML: 3 INJECTION, SOLUTION INTRAVENOUS at 10:07

## 2020-10-18 RX ADMIN — SODIUM CHLORIDE, PRESERVATIVE FREE 10 ML: 5 INJECTION INTRAVENOUS at 08:13

## 2020-10-18 NOTE — PROGRESS NOTES
"INTENSIVIST NOTE     Hospital Day: 7    Mr. David Dempsey, 50 y.o. male is followed for:   Subarachnoid hemorrhage, hypertension, BPH, and GERD       SUBJECTIVE     50-year-old male who developed the sudden onset of a severe headache with nausea and photophobia on 10/11.  He was transferred to our hospital after a CT scan demonstrated a subarachnoid hemorrhage.  He underwent diagnostic angiogram on 10/12 which revealed no culprit lesion for his bleed.  He has a history of a prior left frontal craniotomy in July 2019 for subdural hematoma.  Other medical problems are hypertension, BPH, GERD, and tobacco use.  Diagnostic angiogram on 10/12/2020 revealed no culprit lesions.  Has remained in ICU and being treated for vasospasm and suspected cerebral salt wasting.    Interval history:    Maximum temperature 100.  Fluid balance negative.  Headache slightly better.    The patient's relevant past medical, surgical and social history were reviewed and updated in Epic as appropriate.       OBJECTIVE     Vital Sign Min/Max for last 24 hours  Temp  Min: 98.6 °F (37 °C)  Max: 100 °F (37.8 °C)   BP  Min: 109/67  Max: 163/98   Pulse  Min: 69  Max: 109   Resp  Min: 16  Max: 20   SpO2  Min: 84 %  Max: 97 %   No data recorded   Weight  Min: 61.1 kg (134 lb 11.2 oz)  Max: 61.1 kg (134 lb 11.2 oz)      Intake/Output Summary (Last 24 hours) at 10/18/2020 1536  Last data filed at 10/18/2020 1407  Gross per 24 hour   Intake 3446.31 ml   Output 4325 ml   Net -878.69 ml      Flowsheet Rows      First Filed Value   Admission Height  165.1 cm (65\") Documented at 10/11/2020 2029   Admission Weight  54.3 kg (119 lb 11.4 oz) Documented at 10/11/2020 2029             10/16/20  1234 10/17/20  1202 10/18/20  0600   Weight: 54 kg (119 lb) 54 kg (119 lb) 61.1 kg (134 lb 11.2 oz)            Objective:  General Appearance:  In no acute distress.    Vital signs: (most recent): Blood pressure 137/75, pulse 77, temperature 100 °F (37.8 °C), temperature " "source Axillary, resp. rate 18, height 165.1 cm (65\"), weight 61.1 kg (134 lb 11.2 oz), SpO2 91 %.    HEENT: Normal HEENT exam.    Lungs:  Normal effort and normal respiratory rate.  Breath sounds clear to auscultation.  He is not in respiratory distress.  No rales, wheezes or rhonchi.    Heart: Normal rate.  Regular rhythm.  S1 normal and S2 normal.  No murmur, gallop or friction rub.   Chest: Symmetric chest wall expansion.   Abdomen: Abdomen is soft and non-distended.  Bowel sounds are normal.   There is no abdominal tenderness.   There is no mass. There is no splenomegaly. There is no hepatomegaly.   Extremities: There is no deformity or dependent edema.  (Right femoral catheterization site without bleeding or hematoma)  Neurological: Patient is alert and oriented to person, place and time.    Pupils:  Pupils are equal, round, and reactive to light.    Skin:  Warm and dry.              I reviewed the patient's new clinical results.  I reviewed the patient's new imaging results/reports including actual images and agree with reports.      Chest X-Ray: Worsening asymmetric interstitial infiltrates worse in left lower lobe and right upper lobe    INFUSIONS  sodium chloride, 500 mL, Last Rate: 50 mL/hr at 10/18/20 1106        Results from last 7 days   Lab Units 10/18/20  0541 10/17/20  0044 10/16/20  0652   WBC 10*3/mm3 11.23* 11.03* 10.67   HEMOGLOBIN g/dL 11.9* 11.9* 12.4*   HEMATOCRIT % 36.8* 36.1* 37.2*   PLATELETS 10*3/mm3 223 201 216     Results from last 7 days   Lab Units 10/18/20  1146 10/18/20  0541 10/18/20  0148 10/17/20  1720  10/17/20  0044  10/16/20  0652  10/14/20  0557  10/13/20  0458 10/12/20  0424   SODIUM mmol/L 146* 143 143 139   < > 138  137   < > 133*   < > 141   < > 137 136   POTASSIUM mmol/L  --  4.0  --  3.9  --  3.5   < > 3.5   < > 4.3   < > 4.1 3.9   CHLORIDE mmol/L  --  107  --   --   --  104  --  98   < > 107   < > 104 104   CO2 mmol/L  --  27.0  --   --   --  24.0  --  27.0   < > 26.0 "   < > 23.0 23.0   BUN mg/dL  --  7  --   --   --  7  --  9   < > 9   < > 11 9   GLUCOSE mg/dL  --  145*  --   --   --  149*  --  119*   < > 96   < > 101* 116*   CREATININE mg/dL  --  0.85  --   --   --  0.77  --  0.79   < > 0.86   < > 0.76 0.82   MAGNESIUM mg/dL  --   --   --   --   --   --   --   --   --  2.0  --  1.9 2.7*   CALCIUM mg/dL  --  8.7  --   --   --  8.1*  --  8.5*   < > 8.4*   < > 8.4* 8.0*    < > = values in this interval not displayed.                 [facility] Ventilation:      I reviewed the patient's medications.    Assessment/Plan   ASSESSMENT/PLAN     Active Hospital Problems    Diagnosis   • **SAH    • H/O SDH s/p L frontal craniotomy (7/23/19)   • Tobacco use (1.5 PPD)   • HTN   • Marijuana use   • BPH   • GERD       1. Subarachnoid hemorrhage: No culprit lesion by angiogram  2. Hypertension: Relaxed blood pressure parameters per neurosurgery recommendations to treat only for SBP greater than 200 mmHg  3. Relative hyponatremia with presumed cerebral salt wasting: Hypertonic saline as needed and frequent sodium checks  4. GERD: H2 blocker  5. BPH: Tamsulosin  6. Worsening bilateral infiltrates and low-grade fever with cough.  Will initiate treatment for hospital-acquired pneumonia.    1. Initiate empiric antibiotics with vancomycin and aztreonam pending cultures as he is penicillin allergic  2. Nimodipine  3. 3% saline  4. Serum sodium every 6 hours  5. Daily transcranial Dopplers  6. Continued ICU management     I discussed the patient's findings and my recommendations with patient and nursing staff     Plan of care and goals reviewed with multidisciplinary team at daily rounds.    High level of risk due to:  illness with threat to life or bodily function.    Trevor Ulloa MD  Pulmonary and Critical Care Medicine  10/18/20 15:36 EDT

## 2020-10-18 NOTE — PLAN OF CARE
Problem: Adult Inpatient Plan of Care  Goal: Plan of Care Review  Outcome: Ongoing, Progressing  Flowsheets (Taken 10/18/2020 1831)  Progress: no change  Plan of Care Reviewed With: patient  Outcome Summary: No neuro changes throughout shift. NIHSS remains 0. VSS. UOP adequate. Pt complained of nausea around 1600 without emesis. PRN medication given for nausea and pain. Pt reported that these were effective and rested between care. Q 6 Na checks continued. Na dropped from 146 around 1200 to 142 around 1600. Toyin SMITH notified. She verbalized continuing 3% NaCl at 50 ml/hr and no further changes.     Problem: Pain (Stroke, Hemorrhagic)  Goal: Acceptable Pain Control  Outcome: Ongoing, Progressing  Intervention: Monitor and Manage Pain  Recent Flowsheet Documentation  Taken 10/18/2020 1800 by Sheree Moon, RN  Pain Management Interventions:   relaxation techniques promoted   see MAR   quiet environment facilitated   pillow support provided   position adjusted  Taken 10/18/2020 1400 by Sheree Moon, RN  Pain Management Interventions:   relaxation techniques promoted   quiet environment facilitated   see MAR   position adjusted   pillow support provided  Taken 10/18/2020 1200 by Sheree Moon, RN  Pain Management Interventions:   relaxation techniques promoted   see MAR   quiet environment facilitated   pillow support provided   position adjusted     Problem: Urinary Elimination Impaired (Stroke, Hemorrhagic)  Goal: Effective Urinary Elimination  Outcome: Ongoing, Progressing     Problem: Skin Injury Risk Increased  Goal: Skin Health and Integrity  Outcome: Ongoing, Progressing  Intervention: Optimize Skin Protection  Recent Flowsheet Documentation  Taken 10/18/2020 1800 by Sheree Moon, RN  Pressure Reduction Techniques:   weight shift assistance provided   pressure points protected  Head of Bed (HOB): HOB at 30-45 degrees  Pressure Reduction Devices:   positioning supports utilized   specialty  bed utilized  Skin Protection:   tubing/devices free from skin contact   incontinence pads utilized  Taken 10/18/2020 1600 by Sheree Moon RN  Pressure Reduction Techniques: weight shift assistance provided  Head of Bed (HOB): HOB at 30-45 degrees  Pressure Reduction Devices:   positioning supports utilized   specialty bed utilized  Skin Protection:   tubing/devices free from skin contact   incontinence pads utilized  Taken 10/18/2020 1400 by Sheree Moon RN  Pressure Reduction Techniques:   weight shift assistance provided   pressure points protected  Head of Bed (HOB): HOB at 30-45 degrees  Pressure Reduction Devices:   positioning supports utilized   specialty bed utilized  Skin Protection:   tubing/devices free from skin contact   incontinence pads utilized  Taken 10/18/2020 1200 by Sheree Moon RN  Pressure Reduction Techniques: frequent weight shift encouraged  Head of Bed (HOB): HOB at 30 degrees  Pressure Reduction Devices:   positioning supports utilized   specialty bed utilized  Skin Protection: tubing/devices free from skin contact   Goal Outcome Evaluation:  Plan of Care Reviewed With: patient  Progress: no change  Outcome Summary: No neuro changes throughout shift. NIHSS remains 0. VSS. UOP adequate. Pt complained of nausea around 1600 without emesis. PRN medication given for nausea and pain. Pt reported that these were effective and rested between care. Q 6 Na checks continued. Na dropped from 146 around 1200 to 142 around 1600. Toyin SMITH notified. She verbalized continuing 3% NaCl at 50 ml/hr and no further changes.

## 2020-10-18 NOTE — PLAN OF CARE
Goal Outcome Evaluation:  Plan of Care Reviewed With: patient  Progress: no change  Outcome Summary: No neuro changes throughout shift.  NIH remains a 0.  VSS.  UOP adequate.  Pt requirin multiple doses of prn pain medication throughout night.  Will contuinue to monitor.

## 2020-10-19 ENCOUNTER — APPOINTMENT (OUTPATIENT)
Dept: CARDIOLOGY | Facility: HOSPITAL | Age: 50
End: 2020-10-19

## 2020-10-19 ENCOUNTER — APPOINTMENT (OUTPATIENT)
Dept: GENERAL RADIOLOGY | Facility: HOSPITAL | Age: 50
End: 2020-10-19

## 2020-10-19 ENCOUNTER — APPOINTMENT (OUTPATIENT)
Dept: CT IMAGING | Facility: HOSPITAL | Age: 50
End: 2020-10-19

## 2020-10-19 LAB
ANION GAP SERPL CALCULATED.3IONS-SCNC: 9 MMOL/L (ref 5–15)
BACTERIA BLD CULT: ABNORMAL
BACTERIA SPEC RESP CULT: NORMAL
BASOPHILS # BLD AUTO: 0.02 10*3/MM3 (ref 0–0.2)
BASOPHILS NFR BLD AUTO: 0.2 % (ref 0–1.5)
BH CV VAS TCD LEFT ACA: 54 CM/SEC
BH CV VAS TCD LEFT DISTAL M1: 54 CM/SEC
BH CV VAS TCD LEFT MID M1: 63 CM/SEC
BH CV VAS TCD LEFT P1: 62 CM/SEC
BH CV VAS TCD LEFT P2: 20 CM/SEC
BH CV VAS TCD LEFT PROXIMAL M1: 104 CM/SEC
BH CV VAS TCD LEFT TERMINAL ICA: 74 CM/SEC
BH CV VAS TCD RIGHT ACA: 111 CM/SEC
BH CV VAS TCD RIGHT DISTAL M1: 43 CM/SEC
BH CV VAS TCD RIGHT MID M1: 92 CM/SEC
BH CV VAS TCD RIGHT P1: 60 CM/SEC
BH CV VAS TCD RIGHT P2: 23 CM/SEC
BH CV VAS TCD RIGHT PROXIMAL M1: 85 CM/SEC
BH CV VAS TCD RIGHT TERMINAL ICA: 65 CM/SEC
BUN SERPL-MCNC: 7 MG/DL (ref 6–20)
BUN/CREAT SERPL: 9.2 (ref 7–25)
CALCIUM SPEC-SCNC: 8.8 MG/DL (ref 8.6–10.5)
CHLORIDE SERPL-SCNC: 98 MMOL/L (ref 98–107)
CO2 SERPL-SCNC: 29 MMOL/L (ref 22–29)
CREAT SERPL-MCNC: 0.76 MG/DL (ref 0.76–1.27)
DEPRECATED RDW RBC AUTO: 46.7 FL (ref 37–54)
EOSINOPHIL # BLD AUTO: 0.04 10*3/MM3 (ref 0–0.4)
EOSINOPHIL NFR BLD AUTO: 0.3 % (ref 0.3–6.2)
ERYTHROCYTE [DISTWIDTH] IN BLOOD BY AUTOMATED COUNT: 13.2 % (ref 12.3–15.4)
GFR SERPL CREATININE-BSD FRML MDRD: 108 ML/MIN/1.73
GLUCOSE SERPL-MCNC: 133 MG/DL (ref 65–99)
GRAM STN SPEC: NORMAL
HCT VFR BLD AUTO: 36.3 % (ref 37.5–51)
HGB BLD-MCNC: 12 G/DL (ref 13–17.7)
IMM GRANULOCYTES # BLD AUTO: 0.05 10*3/MM3 (ref 0–0.05)
IMM GRANULOCYTES NFR BLD AUTO: 0.4 % (ref 0–0.5)
LYMPHOCYTES # BLD AUTO: 0.97 10*3/MM3 (ref 0.7–3.1)
LYMPHOCYTES NFR BLD AUTO: 8.3 % (ref 19.6–45.3)
MAGNESIUM SERPL-MCNC: 1.5 MG/DL (ref 1.6–2.6)
MCH RBC QN AUTO: 31.7 PG (ref 26.6–33)
MCHC RBC AUTO-ENTMCNC: 33.1 G/DL (ref 31.5–35.7)
MCV RBC AUTO: 95.8 FL (ref 79–97)
MONOCYTES # BLD AUTO: 0.88 10*3/MM3 (ref 0.1–0.9)
MONOCYTES NFR BLD AUTO: 7.6 % (ref 5–12)
NEUTROPHILS NFR BLD AUTO: 83.2 % (ref 42.7–76)
NEUTROPHILS NFR BLD AUTO: 9.69 10*3/MM3 (ref 1.7–7)
NRBC BLD AUTO-RTO: 0 /100 WBC (ref 0–0.2)
PHOSPHATE SERPL-MCNC: 2.8 MG/DL (ref 2.5–4.5)
PLATELET # BLD AUTO: 256 10*3/MM3 (ref 140–450)
PMV BLD AUTO: 10 FL (ref 6–12)
POTASSIUM SERPL-SCNC: 3.1 MMOL/L (ref 3.5–5.2)
POTASSIUM SERPL-SCNC: 3.9 MMOL/L (ref 3.5–5.2)
RBC # BLD AUTO: 3.79 10*6/MM3 (ref 4.14–5.8)
SODIUM SERPL-SCNC: 135 MMOL/L (ref 136–145)
SODIUM SERPL-SCNC: 136 MMOL/L (ref 136–145)
SODIUM SERPL-SCNC: 136 MMOL/L (ref 136–145)
SODIUM SERPL-SCNC: 137 MMOL/L (ref 136–145)
WBC # BLD AUTO: 11.65 10*3/MM3 (ref 3.4–10.8)

## 2020-10-19 PROCEDURE — 25010000002 PROMETHAZINE PER 50 MG: Performed by: PHYSICIAN ASSISTANT

## 2020-10-19 PROCEDURE — 84295 ASSAY OF SERUM SODIUM: CPT | Performed by: PHYSICIAN ASSISTANT

## 2020-10-19 PROCEDURE — 84132 ASSAY OF SERUM POTASSIUM: CPT | Performed by: INTERNAL MEDICINE

## 2020-10-19 PROCEDURE — 84100 ASSAY OF PHOSPHORUS: CPT | Performed by: INTERNAL MEDICINE

## 2020-10-19 PROCEDURE — 25010000002 VANCOMYCIN PER 500 MG

## 2020-10-19 PROCEDURE — 71045 X-RAY EXAM CHEST 1 VIEW: CPT

## 2020-10-19 PROCEDURE — 99231 SBSQ HOSP IP/OBS SF/LOW 25: CPT | Performed by: PHYSICIAN ASSISTANT

## 2020-10-19 PROCEDURE — 25010000002 MAGNESIUM SULFATE 2 GM/50ML SOLUTION: Performed by: PHYSICIAN ASSISTANT

## 2020-10-19 PROCEDURE — 0 IOPAMIDOL PER 1 ML: Performed by: NEUROLOGICAL SURGERY

## 2020-10-19 PROCEDURE — 93886 INTRACRANIAL COMPLETE STUDY: CPT

## 2020-10-19 PROCEDURE — 25010000002 HEPARIN (PORCINE) PER 1000 UNITS: Performed by: PHYSICIAN ASSISTANT

## 2020-10-19 PROCEDURE — 25010000002 HYDROMORPHONE 1 MG/ML SOLUTION: Performed by: INTERNAL MEDICINE

## 2020-10-19 PROCEDURE — 70496 CT ANGIOGRAPHY HEAD: CPT

## 2020-10-19 PROCEDURE — 83735 ASSAY OF MAGNESIUM: CPT | Performed by: INTERNAL MEDICINE

## 2020-10-19 PROCEDURE — 25010000002 ONDANSETRON PER 1 MG: Performed by: PHYSICIAN ASSISTANT

## 2020-10-19 PROCEDURE — 25010000002 HYDROMORPHONE PER 4 MG: Performed by: INTERNAL MEDICINE

## 2020-10-19 PROCEDURE — 80048 BASIC METABOLIC PNL TOTAL CA: CPT | Performed by: INTERNAL MEDICINE

## 2020-10-19 PROCEDURE — 99232 SBSQ HOSP IP/OBS MODERATE 35: CPT | Performed by: INTERNAL MEDICINE

## 2020-10-19 PROCEDURE — 85025 COMPLETE CBC W/AUTO DIFF WBC: CPT | Performed by: INTERNAL MEDICINE

## 2020-10-19 RX ORDER — HEPARIN SODIUM 5000 [USP'U]/ML
5000 INJECTION, SOLUTION INTRAVENOUS; SUBCUTANEOUS EVERY 8 HOURS SCHEDULED
Status: DISCONTINUED | OUTPATIENT
Start: 2020-10-19 | End: 2020-10-21 | Stop reason: HOSPADM

## 2020-10-19 RX ADMIN — HYDROMORPHONE HYDROCHLORIDE 0.5 MG: 1 INJECTION, SOLUTION INTRAMUSCULAR; INTRAVENOUS; SUBCUTANEOUS at 23:07

## 2020-10-19 RX ADMIN — NIMODIPINE 60 MG: 30 CAPSULE, LIQUID FILLED ORAL at 20:07

## 2020-10-19 RX ADMIN — POTASSIUM CHLORIDE 40 MEQ: 10 CAPSULE, COATED, EXTENDED RELEASE ORAL at 14:25

## 2020-10-19 RX ADMIN — HEPARIN SODIUM 5000 UNITS: 5000 INJECTION, SOLUTION INTRAVENOUS; SUBCUTANEOUS at 22:16

## 2020-10-19 RX ADMIN — NIMODIPINE 60 MG: 30 CAPSULE, LIQUID FILLED ORAL at 08:12

## 2020-10-19 RX ADMIN — FAMOTIDINE 20 MG: 20 TABLET ORAL at 08:13

## 2020-10-19 RX ADMIN — METRONIDAZOLE 500 MG: 500 INJECTION, SOLUTION INTRAVENOUS at 12:03

## 2020-10-19 RX ADMIN — ONDANSETRON HYDROCHLORIDE 4 MG: 2 SOLUTION INTRAMUSCULAR; INTRAVENOUS at 04:15

## 2020-10-19 RX ADMIN — SODIUM CHLORIDE: 234 INJECTION, SOLUTION INTRAVENOUS at 10:33

## 2020-10-19 RX ADMIN — HEPARIN SODIUM 5000 UNITS: 5000 INJECTION, SOLUTION INTRAVENOUS; SUBCUTANEOUS at 14:25

## 2020-10-19 RX ADMIN — POTASSIUM CHLORIDE 40 MEQ: 10 CAPSULE, COATED, EXTENDED RELEASE ORAL at 18:12

## 2020-10-19 RX ADMIN — SODIUM CHLORIDE, PRESERVATIVE FREE 10 ML: 5 INJECTION INTRAVENOUS at 20:07

## 2020-10-19 RX ADMIN — NIMODIPINE 60 MG: 30 CAPSULE, LIQUID FILLED ORAL at 12:02

## 2020-10-19 RX ADMIN — IOPAMIDOL 100 ML: 755 INJECTION, SOLUTION INTRAVENOUS at 11:45

## 2020-10-19 RX ADMIN — HYDROMORPHONE HYDROCHLORIDE 0.5 MG: 1 INJECTION, SOLUTION INTRAMUSCULAR; INTRAVENOUS; SUBCUTANEOUS at 12:03

## 2020-10-19 RX ADMIN — AZTREONAM 2 G: 2 INJECTION, POWDER, LYOPHILIZED, FOR SOLUTION INTRAMUSCULAR; INTRAVENOUS at 08:12

## 2020-10-19 RX ADMIN — MAGNESIUM SULFATE 2 G: 2 INJECTION INTRAVENOUS at 14:00

## 2020-10-19 RX ADMIN — HYDROMORPHONE HYDROCHLORIDE 0.5 MG: 1 INJECTION, SOLUTION INTRAMUSCULAR; INTRAVENOUS; SUBCUTANEOUS at 08:19

## 2020-10-19 RX ADMIN — MAGNESIUM SULFATE 2 G: 2 INJECTION INTRAVENOUS at 09:17

## 2020-10-19 RX ADMIN — MAGNESIUM SULFATE 2 G: 2 INJECTION INTRAVENOUS at 16:17

## 2020-10-19 RX ADMIN — TAMSULOSIN HYDROCHLORIDE 0.4 MG: 0.4 CAPSULE ORAL at 08:13

## 2020-10-19 RX ADMIN — METRONIDAZOLE 500 MG: 500 INJECTION, SOLUTION INTRAVENOUS at 18:12

## 2020-10-19 RX ADMIN — HYDROMORPHONE HYDROCHLORIDE 0.5 MG: 1 INJECTION, SOLUTION INTRAMUSCULAR; INTRAVENOUS; SUBCUTANEOUS at 04:15

## 2020-10-19 RX ADMIN — AZTREONAM 2 G: 2 INJECTION, POWDER, LYOPHILIZED, FOR SOLUTION INTRAMUSCULAR; INTRAVENOUS at 00:20

## 2020-10-19 RX ADMIN — HYDROMORPHONE HYDROCHLORIDE 0.5 MG: 1 INJECTION, SOLUTION INTRAMUSCULAR; INTRAVENOUS; SUBCUTANEOUS at 02:02

## 2020-10-19 RX ADMIN — VANCOMYCIN HYDROCHLORIDE 1000 MG: 1 INJECTION, SOLUTION INTRAVENOUS at 05:30

## 2020-10-19 RX ADMIN — SODIUM CHLORIDE 500 ML: 3 INJECTION, SOLUTION INTRAVENOUS at 02:05

## 2020-10-19 RX ADMIN — HYDROMORPHONE HYDROCHLORIDE 0.5 MG: 1 INJECTION, SOLUTION INTRAMUSCULAR; INTRAVENOUS; SUBCUTANEOUS at 18:12

## 2020-10-19 RX ADMIN — PROMETHAZINE HYDROCHLORIDE 12.5 MG: 25 INJECTION, SOLUTION INTRAMUSCULAR; INTRAVENOUS at 07:04

## 2020-10-19 RX ADMIN — HYDROMORPHONE HYDROCHLORIDE 0.5 MG: 1 INJECTION, SOLUTION INTRAMUSCULAR; INTRAVENOUS; SUBCUTANEOUS at 14:25

## 2020-10-19 RX ADMIN — NIMODIPINE 60 MG: 30 CAPSULE, LIQUID FILLED ORAL at 04:15

## 2020-10-19 RX ADMIN — HYDROCODONE BITARTRATE AND ACETAMINOPHEN 1 TABLET: 5; 325 TABLET ORAL at 10:16

## 2020-10-19 RX ADMIN — FAMOTIDINE 20 MG: 20 TABLET ORAL at 20:07

## 2020-10-19 RX ADMIN — HYDROCODONE BITARTRATE AND ACETAMINOPHEN 1 TABLET: 5; 325 TABLET ORAL at 22:22

## 2020-10-19 RX ADMIN — AZTREONAM 2 G: 2 INJECTION, POWDER, LYOPHILIZED, FOR SOLUTION INTRAMUSCULAR; INTRAVENOUS at 18:12

## 2020-10-19 RX ADMIN — NIMODIPINE 60 MG: 30 CAPSULE, LIQUID FILLED ORAL at 16:17

## 2020-10-19 RX ADMIN — POTASSIUM CHLORIDE 40 MEQ: 10 CAPSULE, COATED, EXTENDED RELEASE ORAL at 08:35

## 2020-10-19 RX ADMIN — HYDROCODONE BITARTRATE AND ACETAMINOPHEN 1 TABLET: 5; 325 TABLET ORAL at 16:17

## 2020-10-19 RX ADMIN — HYDROMORPHONE HYDROCHLORIDE 0.5 MG: 1 INJECTION, SOLUTION INTRAMUSCULAR; INTRAVENOUS; SUBCUTANEOUS at 20:11

## 2020-10-19 NOTE — NURSING NOTE
0900 - In rounds with Dr. Elkins. Notified him of patient's positive sepsis screen. See orders.     1100 - Spoke with Beatrice SMITH regarding starting subcutaneous heparin on the patient. Ok with starting medication. See orders.

## 2020-10-19 NOTE — PROGRESS NOTES
Intensive Care Follow-up     Hospital:  LOS: 8 days   Mr. David Dempsey, 50 y.o. male is followed for:   SAH (subarachnoid hemorrhage) (CMS/[facility])            History of present illness:     50-year-old male who developed the sudden onset of a severe headache with nausea and photophobia on 10/11.  He was transferred to our hospital after a CT scan demonstrated a subarachnoid hemorrhage.  He underwent diagnostic angiogram on 10/12 which revealed no culprit lesion for his bleed.  He has a history of a prior left frontal craniotomy in July 2019 for subdural hematoma.  Other medical problems are hypertension, BPH, GERD, and tobacco use.  Diagnostic angiogram on 10/12/2020 revealed no culprit lesions.  Has remained in ICU and being treated for vasospasm and suspected cerebral salt wasting.    Subjective   Interval History:  Overnight no acute events.  Poor oral intake.  Discussed importance of that with the patient.  IV fluids changed from 3% to 1.5% saline.  Transcranial Dopplers have been stable without any evidence of vasospasm.  Patient feels his headaches are improving.  T-max 100.1.                 The patient's past medical, surgical and social history were reviewed and updated in Epic as appropriate.       Objective     Infusions:  sodium chloride 1.5% (HYPERTONIC) 500 mL infusion, , Last Rate: 75 mL/hr at 10/19/20 1033      Medications:  aztreonam, 2 g, Intravenous, Q8H  famotidine, 20 mg, Oral, Q12H  heparin (porcine), 5,000 Units, Subcutaneous, Q8H  metroNIDAZOLE, 500 mg, Intravenous, Q8H  niMODipine, 60 mg, Oral, Q4H  sodium chloride, 10 mL, Intravenous, Q12H  tamsulosin, 0.4 mg, Oral, Daily        Vital Sign Min/Max for last 24 hours  Temp  Min: 98.5 °F (36.9 °C)  Max: 100.1 °F (37.8 °C)   BP  Min: 123/78  Max: 169/90   Pulse  Min: 75  Max: 118   Resp  Min: 15  Max: 20   SpO2  Min: 89 %  Max: 98 %   Flow (L/min)  Min: 3  Max: 4       Input/Output for last 24 hour shift  10/18 0701 - 10/19 0700  In: 2975.3  [P.O.:1080; I.V.:1495.3]  Out: 4450 [Urine:4450]      Objective     General Appearance: Awake, alert, in no acute distress  Head:    Atraumatic, Normocephalic, without obvious abnormality, Pupils reactive & symmetrical B/L.  Lungs:   B/L Breath sounds present with decreased breath sounds on bases, no wheezing heard, no crackles.   Heart: S1 and S2 present, no murmur  Abdomen: Soft, non-tender, no guarding or rigidity, bowel sounds positive.  Extremities:  no edema, warm to touch.  Pulses: Positive and symmetric.  Neurologic:  Moving all four extremities. Good strength bilaterally.   Interval: (transfer of care)  1a. Level of Consciousness: 0-->Alert, keenly responsive  1b. LOC Questions: 0-->Answers both questions correctly  1c. LOC Commands: 0-->Performs both tasks correctly  2. Best Gaze: 0-->Normal  3. Visual: 0-->No visual loss  4. Facial Palsy: 0-->Normal symmetrical movements  5a. Motor Arm, Left: 0-->No drift, limb holds 90 (or 45) degrees for full 10 secs  5b. Motor Arm, Right: 0-->No drift, limb holds 90 (or 45) degrees for full 10 secs  6a. Motor Leg, Left: 0-->No drift, leg holds 30 degree position for full 5 secs  6b. Motor Leg, Right: 0-->No drift, leg holds 30 degree position for full 5 secs  7. Limb Ataxia: 0-->Absent  8. Sensory: 0-->Normal, no sensory loss  9. Best Language: 0-->No aphasia, normal  10. Dysarthria: 0-->Normal  11. Extinction and Inattention (formerly Neglect): 0-->No abnormality    Total (NIH Stroke Scale): 0        Results from last 7 days   Lab Units 10/19/20  0603 10/18/20  0541 10/17/20  0044   WBC 10*3/mm3 11.65* 11.23* 11.03*   HEMOGLOBIN g/dL 12.0* 11.9* 11.9*   PLATELETS 10*3/mm3 256 223 201     Results from last 7 days   Lab Units 10/19/20  1236 10/19/20  0603 10/19/20  0035  10/18/20  0541  10/17/20  1720  10/17/20  0044  10/14/20  1928 10/14/20  0557  10/13/20  0458   SODIUM mmol/L 136 136 137   < > 143   < > 139   < > 138  137   < >  --  141   < > 137   POTASSIUM mmol/L   --  3.1*  --   --  4.0  --  3.9  --  3.5   < >  --  4.3   < > 4.1   CO2 mmol/L  --  29.0  --   --  27.0  --   --   --  24.0   < >  --  26.0   < > 23.0   BUN mg/dL  --  7  --   --  7  --   --   --  7   < >  --  9   < > 11   CREATININE mg/dL  --  0.76  --   --  0.85  --   --   --  0.77   < >  --  0.86   < > 0.76   MAGNESIUM mg/dL  --  1.5*  --   --   --   --   --   --   --   --   --  2.0  --  1.9   PHOSPHORUS mg/dL  --  2.8  --   --   --   --   --   --   --   --  2.6 2.4*   < > 2.3*   GLUCOSE mg/dL  --  133*  --   --  145*  --   --   --  149*   < >  --  96   < > 101*    < > = values in this interval not displayed.     Estimated Creatinine Clearance: 100.7 mL/min (by C-G formula based on SCr of 0.76 mg/dL).          Images:   CT head report reviewed.  FINDINGS: Noncontrast CT of the head demonstrates decreased conspicuity  of subarachnoid blood products no most prominent in the prepontine  cistern. Otherwise no evidence of interval ischemia, hemorrhage, new  mass or mass effect.     CT angiogram of the head demonstrating patent, normal caliber bilateral  intracranial internal carotid arteries with minimal atherosclerosis not  producing significant associated narrowing. Bilateral anterior cerebral  arteries are patent, normal in course and caliber without evidence of  significant stenosis, occlusion or aneurysmal dilatation. Patent, normal  caliber bilateral middle cerebral arteries without evidence of aneurysm  or significant narrowing. Bilateral intradural vertebral arteries are  normal in course and caliber. The vertebrobasilar system is normal,  without evidence of significant stenosis, occlusion or aneurysmal  dilatation.     IMPRESSION:  Unremarkable CT angiogram of the head without evidence of  etiologic aneurysm to account for subarachnoid hemorrhage.        This report was finalized on 10/19/2020 12:35 PM by Johnny Acevedo.    I reviewed the patient's results and images.     Transcranial Dopplers  showed  normal mean velocities.    Assessment/Plan   Impression        SAH     GERD    BPH    H/O SDH s/p L frontal craniotomy (7/23/19)    Tobacco use (1.5 PPD)    HTN    Marijuana use       Plan        1.  Patient s/p subarachnoid hemorrhage.  No culprit lesion noted on angiogram.  Currently remain on nimodipine.  On 1.5% saline for possible cerebral salt wasting.  Neurosurgery managing.  No evidence of vasospasm at this point.  2.  Patient developed infiltrative process in the left lower lobe concerning for nosocomial pneumonia.  And been on vancomycin and aztreonam.  More than likely appears aspiration pneumonia antibiotics changed to aztreonam and Flagyl.  1 out of 2 cultures is now growing staph species, probably contaminant.  Will monitor finalized cultures and adjust antibiotics accordingly.  3.  Discussed with neurosurgery and will start patient on heparin subcu for DVT prophylaxis as he is not participating in physical therapy and not wanting to get out of bed either and will be at high risk of DVT.  4.  Monitor oral intake closely.  5.  Replace potassium.    Continue close monitoring in ICU for now.    Plan of care and goals reviewed with mulitdisciplinary/antibiotic stewardship team during rounds.   I discussed the patient's findings and my recommendations with patient     High level of risk due to:  illness with threat to life or bodily function.    Time spent 25 min (exclusive of procedure time)  including high complexity decision making to assess, manipulate, and support vital organ system failure in this individual who has impairment of one or more vital organ systems such that there is a high probability of imminent or life threatening deterioration in the patient’s condition.      Lupillo Elkins MD, FCCP  Pulmonary, Critical care and Sleep Medicine

## 2020-10-19 NOTE — PLAN OF CARE
Goal Outcome Evaluation:  Plan of Care Reviewed With: patient  Progress: no change  Outcome Summary: No neuro changers throughout shift.  VSS.  UOP adequate.  Na within normal limits.  Required multiple doses of prn pain and nausea meds.  Will continue to monitor.

## 2020-10-19 NOTE — PROGRESS NOTES
Continued Stay Note  [facility]     Patient Name: David Dempsey  MRN: [id number]  Today's Date: 10/19/2020    Admit Date: 10/11/2020    Discharge Plan     Row Name 10/19/20 1311       Plan    Plan  home    Patient/Family in Agreement with Plan  yes    Plan Comments  Plan still home with family at discharge.  Patient still having Headaches that is requiring pain medication and on 3%saline gtt CM following.    Final Discharge Disposition Code  01 - home or self-care        Discharge Codes    No documentation.       Expected Discharge Date and Time     Expected Discharge Date Expected Discharge Time    Oct 24, 2020             Elaine Ta RN

## 2020-10-19 NOTE — PAYOR COMM NOTE
"Rosio Quiroga RN   Phone [phone]  Fax [phone]      David Carlin (50 y.o. Male)     Date of Birth Social Security Number Address Home Phone MRN    1970  [id number]  [place] [address] [phone] [id number]    Latter-day Marital Status          Unknown Single       Admission Date Admission Type Admitting Provider Attending Provider Department, Room/Bed    10/11/20 Elective Aguila Coronado MD Newman, Charles Benjamin, MD [facility] 2B ICU, N239/1    Discharge Date Discharge Disposition Discharge Destination                       Attending Provider: Favian Iraheta MD    Allergies: Penicillins, Grass    Isolation: None   Infection: None   Code Status: CPR    Ht: 165.1 cm (65\")   Wt: 62.9 kg (138 lb 10.7 oz)    Admission Cmt: None   Principal Problem: SAH  [I60.9]                 Active Insurance as of 10/11/2020     Primary Coverage     Payor Plan Insurance Group Employer/Plan Group    [employer] PAS[employer] [id number]     Payor Plan Address Payor Plan Phone Number Payor Plan Fax Number Effective Dates    [address] [phone]  1/1/2019 - None Entered    [facility] [id number]       Subscriber Name Subscriber Birth Date Member ID       DAVID CARLIN 1970 [id number]                 Emergency Contacts      (Rel.) Home Phone Work Phone Mobile Phone    Archana Gray (Significant Other) [phone] -- [phone]    Ranjit Carlin (Daughter) -- -- [phone]            Vital Signs (last day)     Date/Time   Temp   Temp src   Pulse   Resp   BP   Patient Position   SpO2    10/19/20 0900   --   --   105   --   137/73   --   94    10/19/20 0800   100.1 (37.8)   Oral   83   15   160/91   Lying   92    10/19/20 0700   --   --   96   --   150/81   --   91    10/19/20 0600   --   --   91   --   --   --   92    10/19/20 0500   --   --   118   --   123/78   --   96    10/19/20 0400   99.1 (37.3)   --   82   18   " 145/87   --   93    10/19/20 0300   --   --   95   --   137/72   --   95    10/19/20 0200   --   --   85   --   132/80   --   96    10/19/20 0100   --   --   81   --   134/72   --   95    10/19/20 0000   99.8 (37.7)   --   80   20   156/89   --   93    10/18/20 2300   --   --   84   --   158/86   --   93    10/18/20 2200   --   --   89   --   160/89   --   (!) 89    10/18/20 2100   --   --   86   --   151/91   --   94    10/18/20 2000   99.9 (37.7)   --   88   18   169/90   --   92    10/18/20 1900   --   --   84   --   153/95   --   92    10/18/20 1800   --   --   75   16   169/98   Lying   98    10/18/20 1700   98.5 (36.9)   Oral   75   16   156/93   Lying   96    10/18/20 1600   --   --   75   --   --   --   90    10/18/20 1500   --   --   81   --   157/93   --   91    10/18/20 1411   --   --   77   --   --   --   91    10/18/20 1400   --   --   84   18   137/75   Lying   (!) 88    10/18/20 1300   --   --   73   --   163/98   --   95    10/18/20 1200   100 (37.8)   Axillary   78   16   155/93   Lying   94    10/18/20 1100   --   --   72   --   152/85   --   93    10/18/20 1000   --   --   85   20   134/81   Lying   (!) 84    10/18/20 0900   --   --   109   --   133/70   --   (!) 88    10/18/20 0800   98.6 (37)   Oral   73   18   125/79   Lying   (!) 89    10/18/20 0700   --   --   69   --   126/68   --   94    10/18/20 0600   --   --   80   --   113/58   --   93    10/18/20 0500   --   --   91   --   109/67   --   93    10/18/20 0400   99.7 (37.6)   --   72   16   145/86   --   94    10/18/20 0300   --   --   75   --   129/82   --   94    10/18/20 0200   --   --   83   --   129/78   --   95    10/18/20 0100   --   --   96   --   109/71   --   96    10/18/20 0000   98.6 (37)   --   87   16   136/78   --   94              Current Facility-Administered Medications   Medication Dose Route Frequency Provider Last Rate Last Dose   • acetaminophen (TYLENOL) tablet 1,000 mg  1,000 mg Oral Q6H PRN Екатерина De Los Santos PA-C    1,000 mg at 10/18/20 1246   • aztreonam (AZACTAM) 2 g/100 mL 0.9% NS (mbp)  2 g Intravenous Q8H Trevor Ulloa MD   2 g at 10/19/20 0812   • dextrose (D50W) 25 g/ 50mL Intravenous Solution 25 g  25 g Intravenous Q15 Min PRN Екатерина De Los Santos PA-C       • dextrose (GLUTOSE) oral gel 15 g  15 g Oral Q15 Min PRN Екатерина De Los Santos PA-C       • famotidine (PEPCID) tablet 20 mg  20 mg Oral Q12H Adithya Lau, RPH   20 mg at 10/19/20 0813   • glucagon (human recombinant) (GLUCAGEN DIAGNOSTIC) injection 1 mg  1 mg Subcutaneous Q15 Min PRN Екатерина De Los Santos PA-C       • HYDROcodone-acetaminophen (NORCO) 5-325 MG per tablet 1 tablet  1 tablet Oral Q6H PRN Trevor Ulloa MD   1 tablet at 10/18/20 2203   • HYDROmorphone (DILAUDID) injection 0.5 mg  0.5 mg Intravenous Q2H PRN Trevor Ulloa MD   0.5 mg at 10/19/20 0819   • Magnesium Sulfate 2 gram Bolus, followed by 8 gram infusion (total Mg dose 10 grams)- Mg less than or equal to 1mg/dL  2 g Intravenous PRN Екатерина De Los Santos PA-C        Or   • Magnesium Sulfate 2 gram / 50mL Infusion (GIVE X 3 BAGS TO EQUAL 6GM TOTAL DOSE) - Mg 1.1 - 1.5 mg/dl  2 g Intravenous PRN Екатерина De Los Santos PA-C 25 mL/hr at 10/19/20 0917 2 g at 10/19/20 0917    Or   • Magnesium Sulfate 4 gram infusion- Mg 1.6-1.9 mg/dL  4 g Intravenous PRN Екатерина De Los Santos PA-C 25 mL/hr at 10/13/20 0703 4 g at 10/13/20 0703   • metroNIDAZOLE (FLAGYL) 500 mg/100mL IVPB  500 mg Intravenous Q8H Lupillo Elkins MD       • niMODipine (NIMOTOP) capsule 60 mg  60 mg Oral Q4H Екатерина De Los Santos PA-C   60 mg at 10/19/20 0812   • ondansetron (ZOFRAN) injection 4 mg  4 mg Intravenous Q6H PRN Екатерина De Los Santos PA-C   4 mg at 10/19/20 0415   • potassium chloride (MICRO-K) CR capsule 40 mEq  40 mEq Oral PRN Екатерина De Los Santos PA-C   40 mEq at 10/19/20 0835    Or   • potassium chloride (KLOR-CON) packet 40 mEq  40 mEq Oral PRN Екатерина De Los Santos PA-LISA        Or   • potassium chloride 10 mEq in  100 mL IVPB  10 mEq Intravenous Q1H PRN Екатерина De Los Santos, PA-C       • potassium phosphate 45 mmol in sodium chloride 0.9 % 500 mL infusion  45 mmol Intravenous PRN PinnilsaxЕкатерина, PA-C        Or   • potassium phosphate 30 mmol in sodium chloride 0.9 % 250 mL infusion  30 mmol Intravenous PRN Pinnix, Екатерина, PA-C        Or   • potassium phosphate 15 mmol in sodium chloride 0.9 % 100 mL infusion  15 mmol Intravenous PRN PinnixЕкатерина, PA-C   15 mmol at 10/13/20 2035    Or   • sodium phosphates 45 mmol in sodium chloride 0.9 % 250 mL IVPB  45 mmol Intravenous PRN Pinnix, Екатерина, PA-C        Or   • sodium phosphates 30 mmol in sodium chloride 0.9 % 250 mL IVPB  30 mmol Intravenous PRN Pinnix, Екатерина, PA-C        Or   • sodium phosphates 15 mmol in sodium chloride 0.9 % 250 mL IVPB  15 mmol Intravenous PRN Екатерина De Los Santos PA-C 62.5 mL/hr at 10/14/20 1001 15 mmol at 10/14/20 1001   • promethazine (PHENERGAN) IVPB 12.5 mg  12.5 mg Intravenous Q4H PRN Екатерина De Los Santos PA-C   12.5 mg at 10/19/20 0704   • sennosides-docusate (PERICOLACE) 8.6-50 MG per tablet 2 tablet  2 tablet Oral BID PRN Trevor Ulloa MD       • sodium chloride 0.9 % flush 10 mL  10 mL Intravenous Q12H Favian Iraheta MD   10 mL at 10/18/20 2000   • sodium chloride 0.9 % flush 10 mL  10 mL Intravenous PRN Favian Iraheta MD       • sodium chloride 1.5% (HYPERTONIC) 500 mL infusion   Intravenous Continuous Luz Meyer PA-C       • tamsulosin (FLOMAX) 24 hr capsule 0.4 mg  0.4 mg Oral Daily Екатерина De Los Santos PA-C   0.4 mg at 10/19/20 0813     Lab Results (last 24 hours)     Procedure Component Value Units Date/Time    Magnesium [[id number]]  (Abnormal) Collected: 10/19/20 0603    Specimen: Blood Updated: 10/19/20 0756     Magnesium 1.5 mg/dL     Basic Metabolic Panel [[id number]]  (Abnormal) Collected: 10/19/20 0603    Specimen: Blood Updated: 10/19/20 0755     Glucose 133 mg/dL      BUN 7 mg/dL       Creatinine 0.76 mg/dL      Sodium 136 mmol/L      Potassium 3.1 mmol/L      Chloride 98 mmol/L      CO2 29.0 mmol/L      Calcium 8.8 mg/dL      eGFR Non African Amer 108 mL/min/1.73      BUN/Creatinine Ratio 9.2     Anion Gap 9.0 mmol/L     Narrative:      GFR Normal >60  Chronic Kidney Disease <60  Kidney Failure <15      Phosphorus [[id number]]  (Normal) Collected: 10/19/20 0603    Specimen: Blood Updated: 10/19/20 0755     Phosphorus 2.8 mg/dL     CBC & Differential [[id number]]  (Abnormal) Collected: 10/19/20 0603    Specimen: Blood Updated: 10/19/20 0724    Narrative:      The following orders were created for panel order CBC & Differential.  Procedure                               Abnormality         Status                     ---------                               -----------         ------                     CBC Auto Differential[[id number]]        Abnormal            Final result                 Please view results for these tests on the individual orders.    CBC Auto Differential [[id number]]  (Abnormal) Collected: 10/19/20 0603    Specimen: Blood Updated: 10/19/20 0724     WBC 11.65 10*3/mm3      RBC 3.79 10*6/mm3      Hemoglobin 12.0 g/dL      Hematocrit 36.3 %      MCV 95.8 fL      MCH 31.7 pg      MCHC 33.1 g/dL      RDW 13.2 %      RDW-SD 46.7 fl      MPV 10.0 fL      Platelets 256 10*3/mm3      Neutrophil % 83.2 %      Lymphocyte % 8.3 %      Monocyte % 7.6 %      Eosinophil % 0.3 %      Basophil % 0.2 %      Immature Grans % 0.4 %      Neutrophils, Absolute 9.69 10*3/mm3      Lymphocytes, Absolute 0.97 10*3/mm3      Monocytes, Absolute 0.88 10*3/mm3      Eosinophils, Absolute 0.04 10*3/mm3      Basophils, Absolute 0.02 10*3/mm3      Immature Grans, Absolute 0.05 10*3/mm3      nRBC 0.0 /100 WBC     Blood Culture - Blood, Arm, Left [[id number]] Collected: 10/17/20 0044    Specimen: Blood from Arm, Left Updated: 10/19/20 0130     Blood Culture No growth at 2 days    Blood Culture - Blood, Arm, Left  [[id number]] Collected: 10/17/20 0044    Specimen: Blood from Arm, Left Updated: 10/19/20 0130     Blood Culture No growth at 2 days    Sodium [[id number]]  (Normal) Collected: 10/19/20 0035    Specimen: Blood Updated: 10/19/20 0122     Sodium 137 mmol/L     MRSA Screen, PCR (Inpatient) - Swab, Nares [[id number]]  (Normal) Collected: 10/18/20 1815    Specimen: Swab from Nares Updated: 10/18/20 1937     MRSA PCR Negative    Narrative:      MRSA Negative    Blood Culture - Blood, Arm, Left [[id number]] Collected: 10/18/20 1604    Specimen: Blood from Arm, Left Updated: 10/18/20 1644    Blood Culture - Blood, Arm, Left [[id number]] Collected: 10/18/20 1604    Specimen: Blood from Arm, Left Updated: 10/18/20 1644    Sodium [[id number]]  (Normal) Collected: 10/18/20 1604    Specimen: Blood Updated: 10/18/20 1631     Sodium 142 mmol/L     Sodium [[id number]]  (Abnormal) Collected: 10/18/20 1146    Specimen: Blood Updated: 10/18/20 1256     Sodium 146 mmol/L         Imaging Results (Last 24 Hours)     Procedure Component Value Units Date/Time    XR Chest 1 View [[id number]] Collected: 10/19/20 0859     Updated: 10/19/20 0900    Narrative:         EXAMINATION: XR CHEST 1 VW-      INDICATION: Follow-up infiltrates; I60.9-Nontraumatic subarachnoid  hemorrhage, unspecified; I60.9-Nontraumatic subarachnoid hemorrhage,  unspecified      COMPARISON: 10/18/2020     FINDINGS: Portable chest reveals patchy airspace disease seen within the  left lower lung field in its periphery. There is mild increased markings  seen in the upper lobes. Prominence of the hilar region on the left.  PICC line catheter the right tip in the SVC. Degenerative changes seen  within the spine. Small left pleural effusion.           Impression:      Stable chest as above                 Physician Progress Notes (last 24 hours) (Notes from 10/18/20 0952 through 10/19/20 0952)      Luz Meyer PA-C at 10/19/20 0938          NEUROSURGERY PROGRESS NOTE    Interval  "History:   Hospital day 8 admission for subarachnoid hemorrhage.  Patient had low-grade fever over the weekend, T-max 100, currently being treated for hospital-acquired pneumonia.  He reports his headaches are slightly improved but still 5/10, well controlled with pain medication.    Vital Signs  Blood pressure 137/73, pulse 105, temperature 100.1 °F (37.8 °C), temperature source Oral, resp. rate 15, height 165.1 cm (65\"), weight 62.9 kg (138 lb 10.7 oz), SpO2 94 %.    Physical Exam:  Patient is resting comfortably in bed.  He appears mildly uncomfortable secondary to headache.  No pronator drift.  Cranial nerves II through XII are grossly intact.     Results Review:    I/O last 3 completed shifts:  In: 5045 [P.O.:1903; I.V.:2742; IV Piggyback:400]  Out: 6350 [Urine:6350]  I/O this shift:  In: 395.6 [I.V.:395.6]  Out: 550 [Urine:550]     Daily Transcranial Dopplers have remained stable with normal velocities and waveforms. No evidence of vasospasm.     Lab Results   Lab Value Date/Time     10/19/2020 0603     10/19/2020 0035     10/18/2020 1604     (H) 10/18/2020 1146     10/18/2020 0541     10/18/2020 0148     10/17/2020 1720     10/17/2020 1128       Assessment/Plan:   Subarachnoid hemorrhage  We will begin to slowly back off of hypertonic saline.  D/c 3% and begin 1.5% @ 75 mL/hr   Repeat CTA head today  Currently being treated for hospital-acquired pneumonia per intensivist    Luz Meyer PA-C  10/19/20  09:38 EDT      Electronically signed by Luz Meyer PA-C at 10/19/20 0944     Trevor Ulloa MD at 10/18/20 1536          INTENSIVIST NOTE     Hospital Day: 7    Mr. David Dempsey, 50 y.o. male is followed for:   Subarachnoid hemorrhage, hypertension, BPH, and GERD       SUBJECTIVE     50-year-old male who developed the sudden onset of a severe headache with nausea and photophobia on 10/11.  He was transferred to our hospital after a CT scan " "demonstrated a subarachnoid hemorrhage.  He underwent diagnostic angiogram on 10/12 which revealed no culprit lesion for his bleed.  He has a history of a prior left frontal craniotomy in July 2019 for subdural hematoma.  Other medical problems are hypertension, BPH, GERD, and tobacco use.  Diagnostic angiogram on 10/12/2020 revealed no culprit lesions.  Has remained in ICU and being treated for vasospasm and suspected cerebral salt wasting.    Interval history:    Maximum temperature 100.  Fluid balance negative.  Headache slightly better.    The patient's relevant past medical, surgical and social history were reviewed and updated in Epic as appropriate.       OBJECTIVE     Vital Sign Min/Max for last 24 hours  Temp  Min: 98.6 °F (37 °C)  Max: 100 °F (37.8 °C)   BP  Min: 109/67  Max: 163/98   Pulse  Min: 69  Max: 109   Resp  Min: 16  Max: 20   SpO2  Min: 84 %  Max: 97 %   No data recorded   Weight  Min: 61.1 kg (134 lb 11.2 oz)  Max: 61.1 kg (134 lb 11.2 oz)      Intake/Output Summary (Last 24 hours) at 10/18/2020 1536  Last data filed at 10/18/2020 1407  Gross per 24 hour   Intake 3446.31 ml   Output 4325 ml   Net -878.69 ml      Flowsheet Rows      First Filed Value   Admission Height  165.1 cm (65\") Documented at 10/11/2020 2029   Admission Weight  54.3 kg (119 lb 11.4 oz) Documented at 10/11/2020 2029             10/16/20  1234 10/17/20  1202 10/18/20  0600   Weight: 54 kg (119 lb) 54 kg (119 lb) 61.1 kg (134 lb 11.2 oz)            Objective:  General Appearance:  In no acute distress.    Vital signs: (most recent): Blood pressure 137/75, pulse 77, temperature 100 °F (37.8 °C), temperature source Axillary, resp. rate 18, height 165.1 cm (65\"), weight 61.1 kg (134 lb 11.2 oz), SpO2 91 %.    HEENT: Normal HEENT exam.    Lungs:  Normal effort and normal respiratory rate.  Breath sounds clear to auscultation.  He is not in respiratory distress.  No rales, wheezes or rhonchi.    Heart: Normal rate.  Regular rhythm. "  S1 normal and S2 normal.  No murmur, gallop or friction rub.   Chest: Symmetric chest wall expansion.   Abdomen: Abdomen is soft and non-distended.  Bowel sounds are normal.   There is no abdominal tenderness.   There is no mass. There is no splenomegaly. There is no hepatomegaly.   Extremities: There is no deformity or dependent edema.  (Right femoral catheterization site without bleeding or hematoma)  Neurological: Patient is alert and oriented to person, place and time.    Pupils:  Pupils are equal, round, and reactive to light.    Skin:  Warm and dry.              I reviewed the patient's new clinical results.  I reviewed the patient's new imaging results/reports including actual images and agree with reports.      Chest X-Ray: Worsening asymmetric interstitial infiltrates worse in left lower lobe and right upper lobe    INFUSIONS  sodium chloride, 500 mL, Last Rate: 50 mL/hr at 10/18/20 1106        Results from last 7 days   Lab Units 10/18/20  0541 10/17/20  0044 10/16/20  0652   WBC 10*3/mm3 11.23* 11.03* 10.67   HEMOGLOBIN g/dL 11.9* 11.9* 12.4*   HEMATOCRIT % 36.8* 36.1* 37.2*   PLATELETS 10*3/mm3 223 201 216     Results from last 7 days   Lab Units 10/18/20  1146 10/18/20  0541 10/18/20  0148 10/17/20  1720  10/17/20  0044  10/16/20  0652  10/14/20  0557  10/13/20  0458 10/12/20  0424   SODIUM mmol/L 146* 143 143 139   < > 138  137   < > 133*   < > 141   < > 137 136   POTASSIUM mmol/L  --  4.0  --  3.9  --  3.5   < > 3.5   < > 4.3   < > 4.1 3.9   CHLORIDE mmol/L  --  107  --   --   --  104  --  98   < > 107   < > 104 104   CO2 mmol/L  --  27.0  --   --   --  24.0  --  27.0   < > 26.0   < > 23.0 23.0   BUN mg/dL  --  7  --   --   --  7  --  9   < > 9   < > 11 9   GLUCOSE mg/dL  --  145*  --   --   --  149*  --  119*   < > 96   < > 101* 116*   CREATININE mg/dL  --  0.85  --   --   --  0.77  --  0.79   < > 0.86   < > 0.76 0.82   MAGNESIUM mg/dL  --   --   --   --   --   --   --   --   --  2.0  --  1.9 2.7*    CALCIUM mg/dL  --  8.7  --   --   --  8.1*  --  8.5*   < > 8.4*   < > 8.4* 8.0*    < > = values in this interval not displayed.                 [facility] Ventilation:      I reviewed the patient's medications.    Assessment/Plan   ASSESSMENT/PLAN     Active Hospital Problems    Diagnosis   • **SAH    • H/O SDH s/p L frontal craniotomy (7/23/19)   • Tobacco use (1.5 PPD)   • HTN   • Marijuana use   • BPH   • GERD       1. Subarachnoid hemorrhage: No culprit lesion by angiogram  2. Hypertension: Relaxed blood pressure parameters per neurosurgery recommendations to treat only for SBP greater than 200 mmHg  3. Relative hyponatremia with presumed cerebral salt wasting: Hypertonic saline as needed and frequent sodium checks  4. GERD: H2 blocker  5. BPH: Tamsulosin  6. Worsening bilateral infiltrates and low-grade fever with cough.  Will initiate treatment for hospital-acquired pneumonia.    1. Initiate empiric antibiotics with vancomycin and aztreonam pending cultures as he is penicillin allergic  2. Nimodipine  3. 3% saline  4. Serum sodium every 6 hours  5. Daily transcranial Dopplers  6. Continued ICU management     I discussed the patient's findings and my recommendations with patient and nursing staff     Plan of care and goals reviewed with multidisciplinary team at daily rounds.    High level of risk due to:  illness with threat to life or bodily function.    Trevor Ulloa MD  Pulmonary and Critical Care Medicine  10/18/20 15:36 EDT         Electronically signed by Trevor Ulloa MD at 10/18/20 1545       Consult Notes (last 24 hours) (Notes from 10/18/20 0952 through 10/19/20 0952)    No notes of this type exist for this encounter.

## 2020-10-19 NOTE — PROGRESS NOTES
"Clinical Nutrition Note      Patient Name: David Dempsey  MRN: [id number]  Admission date: 10/11/2020      Multidisciplinary Rounds    Additional information obtained during MDR:  PT not ambulating, not eating much, daily TCDs , request nause and pain meds for HA. No vasospasms noted per neurosurgery, pt on hypertonic saline and no water.    Current diet: Diet Regular    Oral Nutrition Supplement:    Pertinent medical data reviewed:  No nutrition risk identified on nursing screen; MST score \"0\"  Oral intake data insufficient: 2 meals documented are trending upward.    Intervention:  Plan of care and goals reviewed    Monitor:  RD to follow per protocol      Denae Sheikh MS,RD,LD  10/19/20 17:19 EDT  Time: 15  mins       "

## 2020-10-19 NOTE — PROGRESS NOTES
"NEUROSURGERY PROGRESS NOTE    Interval History:   Hospital day 8 admission for subarachnoid hemorrhage.  Patient had low-grade fever over the weekend, T-max 100, currently being treated for hospital-acquired pneumonia.  He reports his headaches are slightly improved but still 5/10, well controlled with pain medication.    Vital Signs  Blood pressure 137/73, pulse 105, temperature 100.1 °F (37.8 °C), temperature source Oral, resp. rate 15, height 165.1 cm (65\"), weight 62.9 kg (138 lb 10.7 oz), SpO2 94 %.    Physical Exam:  Patient is resting comfortably in bed.  He appears mildly uncomfortable secondary to headache.  No pronator drift.  Cranial nerves II through XII are grossly intact.     Results Review:    I/O last 3 completed shifts:  In: 5045 [P.O.:1903; I.V.:2742; IV Piggyback:400]  Out: 6350 [Urine:6350]  I/O this shift:  In: 395.6 [I.V.:395.6]  Out: 550 [Urine:550]     Daily Transcranial Dopplers have remained stable with normal velocities and waveforms. No evidence of vasospasm.     Lab Results   Lab Value Date/Time     10/19/2020 0603     10/19/2020 0035     10/18/2020 1604     (H) 10/18/2020 1146     10/18/2020 0541     10/18/2020 0148     10/17/2020 1720     10/17/2020 1128       Assessment/Plan:   Subarachnoid hemorrhage  We will begin to slowly back off of hypertonic saline.  D/c 3% and begin 1.5% @ 75 mL/hr   Repeat CTA head today  Currently being treated for hospital-acquired pneumonia per intensivist    Luz Meyer PA-C  10/19/20  09:38 EDT    "

## 2020-10-20 ENCOUNTER — APPOINTMENT (OUTPATIENT)
Dept: CARDIOLOGY | Facility: HOSPITAL | Age: 50
End: 2020-10-20

## 2020-10-20 ENCOUNTER — APPOINTMENT (OUTPATIENT)
Dept: GENERAL RADIOLOGY | Facility: HOSPITAL | Age: 50
End: 2020-10-20

## 2020-10-20 LAB
ANION GAP SERPL CALCULATED.3IONS-SCNC: 10 MMOL/L (ref 5–15)
BACTERIA SPEC AEROBE CULT: ABNORMAL
BH CV VAS TCD LEFT DISTAL M1: 58 CM/SEC
BH CV VAS TCD LEFT MID M1: 67 CM/SEC
BH CV VAS TCD LEFT PROXIMAL M1: 69 CM/SEC
BH CV VAS TCD LEFT TERMINAL ICA: 60 CM/SEC
BH CV VAS TCD RIGHT DISTAL M1: 63 CM/SEC
BH CV VAS TCD RIGHT MID M1: 62 CM/SEC
BH CV VAS TCD RIGHT PROXIMAL M1: 55 CM/SEC
BH CV VAS TCD RIGHT TERMINAL ICA: 48 CM/SEC
BUN SERPL-MCNC: 9 MG/DL (ref 6–20)
BUN/CREAT SERPL: 14.3 (ref 7–25)
CALCIUM SPEC-SCNC: 7.6 MG/DL (ref 8.6–10.5)
CHLORIDE SERPL-SCNC: 101 MMOL/L (ref 98–107)
CO2 SERPL-SCNC: 22 MMOL/L (ref 22–29)
CREAT SERPL-MCNC: 0.63 MG/DL (ref 0.76–1.27)
GFR SERPL CREATININE-BSD FRML MDRD: 134 ML/MIN/1.73
GLUCOSE SERPL-MCNC: 118 MG/DL (ref 65–99)
GRAM STN SPEC: ABNORMAL
GRAM STN SPEC: ABNORMAL
ISOLATED FROM: ABNORMAL
MAGNESIUM SERPL-MCNC: 2.1 MG/DL (ref 1.6–2.6)
NT-PROBNP SERPL-MCNC: 1957 PG/ML (ref 0–900)
PHOSPHATE SERPL-MCNC: 2.7 MG/DL (ref 2.5–4.5)
POTASSIUM SERPL-SCNC: 3.7 MMOL/L (ref 3.5–5.2)
SODIUM SERPL-SCNC: 133 MMOL/L (ref 136–145)
SODIUM SERPL-SCNC: 134 MMOL/L (ref 136–145)
SODIUM SERPL-SCNC: 135 MMOL/L (ref 136–145)
SODIUM SERPL-SCNC: 135 MMOL/L (ref 136–145)
SODIUM SERPL-SCNC: 137 MMOL/L (ref 136–145)

## 2020-10-20 PROCEDURE — 99232 SBSQ HOSP IP/OBS MODERATE 35: CPT | Performed by: INTERNAL MEDICINE

## 2020-10-20 PROCEDURE — 25010000002 KETOROLAC TROMETHAMINE PER 15 MG: Performed by: NEUROLOGICAL SURGERY

## 2020-10-20 PROCEDURE — 80048 BASIC METABOLIC PNL TOTAL CA: CPT | Performed by: INTERNAL MEDICINE

## 2020-10-20 PROCEDURE — 83880 ASSAY OF NATRIURETIC PEPTIDE: CPT | Performed by: INTERNAL MEDICINE

## 2020-10-20 PROCEDURE — 84295 ASSAY OF SERUM SODIUM: CPT | Performed by: PHYSICIAN ASSISTANT

## 2020-10-20 PROCEDURE — 94640 AIRWAY INHALATION TREATMENT: CPT

## 2020-10-20 PROCEDURE — 25010000002 HEPARIN (PORCINE) PER 1000 UNITS: Performed by: PHYSICIAN ASSISTANT

## 2020-10-20 PROCEDURE — 93888 INTRACRANIAL LIMITED STUDY: CPT

## 2020-10-20 PROCEDURE — 71045 X-RAY EXAM CHEST 1 VIEW: CPT

## 2020-10-20 PROCEDURE — 84100 ASSAY OF PHOSPHORUS: CPT | Performed by: INTERNAL MEDICINE

## 2020-10-20 PROCEDURE — 25010000002 HYDROMORPHONE PER 4 MG: Performed by: INTERNAL MEDICINE

## 2020-10-20 PROCEDURE — 83735 ASSAY OF MAGNESIUM: CPT | Performed by: INTERNAL MEDICINE

## 2020-10-20 PROCEDURE — 25010000002 ONDANSETRON PER 1 MG: Performed by: PHYSICIAN ASSISTANT

## 2020-10-20 PROCEDURE — 99231 SBSQ HOSP IP/OBS SF/LOW 25: CPT | Performed by: PHYSICIAN ASSISTANT

## 2020-10-20 PROCEDURE — 84295 ASSAY OF SERUM SODIUM: CPT | Performed by: NURSE PRACTITIONER

## 2020-10-20 RX ORDER — HYDROMORPHONE HYDROCHLORIDE 1 MG/ML
0.5 INJECTION, SOLUTION INTRAMUSCULAR; INTRAVENOUS; SUBCUTANEOUS EVERY 4 HOURS PRN
Status: DISCONTINUED | OUTPATIENT
Start: 2020-10-20 | End: 2020-10-20

## 2020-10-20 RX ORDER — KETOROLAC TROMETHAMINE 30 MG/ML
30 INJECTION, SOLUTION INTRAMUSCULAR; INTRAVENOUS EVERY 6 HOURS PRN
Status: DISCONTINUED | OUTPATIENT
Start: 2020-10-20 | End: 2020-10-21 | Stop reason: HOSPADM

## 2020-10-20 RX ORDER — IPRATROPIUM BROMIDE AND ALBUTEROL SULFATE 2.5; .5 MG/3ML; MG/3ML
3 SOLUTION RESPIRATORY (INHALATION) EVERY 4 HOURS PRN
Status: DISCONTINUED | OUTPATIENT
Start: 2020-10-20 | End: 2020-10-21 | Stop reason: HOSPADM

## 2020-10-20 RX ORDER — OXYCODONE AND ACETAMINOPHEN 7.5; 325 MG/1; MG/1
1 TABLET ORAL EVERY 4 HOURS PRN
Status: DISCONTINUED | OUTPATIENT
Start: 2020-10-20 | End: 2020-10-21 | Stop reason: HOSPADM

## 2020-10-20 RX ORDER — HYDROCODONE BITARTRATE AND ACETAMINOPHEN 7.5; 325 MG/1; MG/1
1 TABLET ORAL EVERY 6 HOURS PRN
Status: DISCONTINUED | OUTPATIENT
Start: 2020-10-20 | End: 2020-10-20

## 2020-10-20 RX ADMIN — NIMODIPINE 60 MG: 30 CAPSULE, LIQUID FILLED ORAL at 20:35

## 2020-10-20 RX ADMIN — FAMOTIDINE 20 MG: 20 TABLET ORAL at 20:35

## 2020-10-20 RX ADMIN — ACETAMINOPHEN 1000 MG: 500 TABLET, FILM COATED ORAL at 02:29

## 2020-10-20 RX ADMIN — HEPARIN SODIUM 5000 UNITS: 5000 INJECTION, SOLUTION INTRAVENOUS; SUBCUTANEOUS at 06:24

## 2020-10-20 RX ADMIN — HYDROCODONE BITARTRATE AND ACETAMINOPHEN 1 TABLET: 5; 325 TABLET ORAL at 04:43

## 2020-10-20 RX ADMIN — ONDANSETRON HYDROCHLORIDE 4 MG: 2 SOLUTION INTRAMUSCULAR; INTRAVENOUS at 20:35

## 2020-10-20 RX ADMIN — IPRATROPIUM BROMIDE AND ALBUTEROL SULFATE 3 ML: 2.5; .5 SOLUTION RESPIRATORY (INHALATION) at 00:49

## 2020-10-20 RX ADMIN — OXYCODONE HYDROCHLORIDE AND ACETAMINOPHEN 1 TABLET: 7.5; 325 TABLET ORAL at 20:35

## 2020-10-20 RX ADMIN — TAMSULOSIN HYDROCHLORIDE 0.4 MG: 0.4 CAPSULE ORAL at 08:17

## 2020-10-20 RX ADMIN — HEPARIN SODIUM 5000 UNITS: 5000 INJECTION, SOLUTION INTRAVENOUS; SUBCUTANEOUS at 21:00

## 2020-10-20 RX ADMIN — NIMODIPINE 60 MG: 30 CAPSULE, LIQUID FILLED ORAL at 00:11

## 2020-10-20 RX ADMIN — ONDANSETRON HYDROCHLORIDE 4 MG: 2 SOLUTION INTRAMUSCULAR; INTRAVENOUS at 13:19

## 2020-10-20 RX ADMIN — AZTREONAM 2 G: 2 INJECTION, POWDER, LYOPHILIZED, FOR SOLUTION INTRAMUSCULAR; INTRAVENOUS at 00:11

## 2020-10-20 RX ADMIN — METRONIDAZOLE 500 MG: 500 INJECTION, SOLUTION INTRAVENOUS at 17:54

## 2020-10-20 RX ADMIN — KETOROLAC TROMETHAMINE 30 MG: 30 INJECTION, SOLUTION INTRAMUSCULAR at 20:35

## 2020-10-20 RX ADMIN — OXYCODONE HYDROCHLORIDE AND ACETAMINOPHEN 1 TABLET: 7.5; 325 TABLET ORAL at 13:19

## 2020-10-20 RX ADMIN — METRONIDAZOLE 500 MG: 500 INJECTION, SOLUTION INTRAVENOUS at 11:32

## 2020-10-20 RX ADMIN — HYDROCODONE BITARTRATE AND ACETAMINOPHEN 1 TABLET: 7.5; 325 TABLET ORAL at 11:32

## 2020-10-20 RX ADMIN — KETOROLAC TROMETHAMINE 30 MG: 30 INJECTION, SOLUTION INTRAMUSCULAR at 14:22

## 2020-10-20 RX ADMIN — SODIUM CHLORIDE: 234 INJECTION, SOLUTION INTRAVENOUS at 14:19

## 2020-10-20 RX ADMIN — HYDROMORPHONE HYDROCHLORIDE 0.5 MG: 1 INJECTION, SOLUTION INTRAMUSCULAR; INTRAVENOUS; SUBCUTANEOUS at 08:17

## 2020-10-20 RX ADMIN — NIMODIPINE 60 MG: 30 CAPSULE, LIQUID FILLED ORAL at 04:41

## 2020-10-20 RX ADMIN — SODIUM CHLORIDE: 234 INJECTION, SOLUTION INTRAVENOUS at 00:10

## 2020-10-20 RX ADMIN — NIMODIPINE 60 MG: 30 CAPSULE, LIQUID FILLED ORAL at 16:02

## 2020-10-20 RX ADMIN — FAMOTIDINE 20 MG: 20 TABLET ORAL at 08:17

## 2020-10-20 RX ADMIN — METRONIDAZOLE 500 MG: 500 INJECTION, SOLUTION INTRAVENOUS at 02:19

## 2020-10-20 RX ADMIN — NIMODIPINE 60 MG: 30 CAPSULE, LIQUID FILLED ORAL at 08:17

## 2020-10-20 RX ADMIN — NIMODIPINE 60 MG: 30 CAPSULE, LIQUID FILLED ORAL at 11:32

## 2020-10-20 RX ADMIN — HEPARIN SODIUM 5000 UNITS: 5000 INJECTION, SOLUTION INTRAVENOUS; SUBCUTANEOUS at 14:10

## 2020-10-20 RX ADMIN — SODIUM CHLORIDE, PRESERVATIVE FREE 10 ML: 5 INJECTION INTRAVENOUS at 20:36

## 2020-10-20 RX ADMIN — AZTREONAM 2 G: 2 INJECTION, POWDER, LYOPHILIZED, FOR SOLUTION INTRAMUSCULAR; INTRAVENOUS at 16:02

## 2020-10-20 RX ADMIN — HYDROMORPHONE HYDROCHLORIDE 0.5 MG: 1 INJECTION, SOLUTION INTRAMUSCULAR; INTRAVENOUS; SUBCUTANEOUS at 03:23

## 2020-10-20 RX ADMIN — AZTREONAM 2 G: 2 INJECTION, POWDER, LYOPHILIZED, FOR SOLUTION INTRAMUSCULAR; INTRAVENOUS at 08:17

## 2020-10-20 NOTE — PROGRESS NOTES
"NEUROSURGERY PROGRESS NOTE    Interval History:   Hospital day 9 admission for subarachnoid hemorrhage.  Patient had a 9/10 headache overnight that improved with IV pain medication.  He continues to complain of a 6/10 headache which has been stable throughout his hospitalization.  He has remained quite sedentary, not participating in physical therapy, and was started on subcutaneous heparin yesterday.  He denies any nausea, vomiting, visual changes, weakness or numbness this morning.    Vital Signs  Blood pressure 158/96, pulse 72, temperature 97.7 °F (36.5 °C), temperature source Axillary, resp. rate 18, height 170 cm (66.93\"), weight 58.5 kg (128 lb 15.5 oz), SpO2 97 %.    Physical Exam:  Patient is resting comfortably in bed in no apparent distress.  He is conversant and appropriate.  Speech is intact and fluent.  Cranial nerves II through XII are grossly intact.  No pronator drift.     Results Review:    I/O last 3 completed shifts:  In: 5556.3 [P.O.:1960; I.V.:2794.4; IV Piggyback:801.9]  Out: 6095 [Urine:6095]  I/O this shift:  In: 143.5 [I.V.:143.5]  Out: 475 [Urine:475]     CT ANGIOGRAM HEAD (10/19/2020)   IMPRESSION:  Unremarkable CT angiogram of the head without evidence of  etiologic aneurysm to account for subarachnoid hemorrhage.    Lab Results   Lab Value Date/Time     10/20/2020 0851     (L) 10/20/2020 0321     (L) 10/20/2020 0057     (L) 10/19/2020 1748     10/19/2020 1236     10/19/2020 0603       Assessment/Plan:   Subarachnoid hemorrhage  Continue nimodipine  Transcranial Dopplers remain stable  Most recent sodium was 137.  Will continue to slowly back off of hypertonic saline.  Continue 1.5% @50 mL/hr  Hospital-acquired pneumonia: Treatment and antibiotics per intensivist  Encouraged patient to participate with PT/OT    Luz Meyer PA-C  10/20/20  10:09 EDT    "

## 2020-10-20 NOTE — PROGRESS NOTES
Intensive Care Follow-up     Hospital:  LOS: 9 days   Mr. David Dempsey, 50 y.o. male is followed for:   SAH (subarachnoid hemorrhage) (CMS/[facility])            History of present illness:     50-year-old male who developed the sudden onset of a severe headache with nausea and photophobia on 10/11.  He was transferred to our hospital after a CT scan demonstrated a subarachnoid hemorrhage.  He underwent diagnostic angiogram on 10/12 which revealed no culprit lesion for his bleed.  He has a history of a prior left frontal craniotomy in July 2019 for subdural hematoma.  Other medical problems are hypertension, BPH, GERD, and tobacco use.  Diagnostic angiogram on 10/12/2020 revealed no culprit lesions.  Has remained in ICU and being treated for vasospasm and suspected cerebral salt wasting.    Subjective   Interval History:  Overnight no acute events.  Improved oral intake.  Complains of headache overnight.  Neurosurgery is managing pain medications and taken off Dilaudid and started on Percocet.  Hemodynamically remained stable.  Sodium level is acceptable for now.  Transcranial Dopplers are acceptable as well.                 The patient's past medical, surgical and social history were reviewed and updated in Epic as appropriate.       Objective     Infusions:  sodium chloride 1.5% (HYPERTONIC) 500 mL infusion, , Last Rate: 50 mL/hr at 10/20/20 1133      Medications:  aztreonam, 2 g, Intravenous, Q8H  famotidine, 20 mg, Oral, Q12H  heparin (porcine), 5,000 Units, Subcutaneous, Q8H  metroNIDAZOLE, 500 mg, Intravenous, Q8H  niMODipine, 60 mg, Oral, Q4H  sodium chloride, 10 mL, Intravenous, Q12H  tamsulosin, 0.4 mg, Oral, Daily        Vital Sign Min/Max for last 24 hours  Temp  Min: 97.7 °F (36.5 °C)  Max: 99.7 °F (37.6 °C)   BP  Min: 112/73  Max: 158/96   Pulse  Min: 64  Max: 106   Resp  Min: 16  Max: 21   SpO2  Min: 80 %  Max: 99 %   Flow (L/min)  Min: 2  Max: 4       Input/Output for last 24 hour shift  10/19 0701 - 10/20  "0700  In: 3594.3 [P.O.:880; I.V.:2212.4]  Out: 3995 [Urine:3995]      Objective:  Vital signs: (most recent): Blood pressure 142/92, pulse 70, temperature 98.5 °F (36.9 °C), temperature source Oral, resp. rate 17, height 167.6 cm (66\"), weight 58.1 kg (128 lb), SpO2 92 %.               General Appearance: Awake, alert, in no acute distress  Head:    Atraumatic, Normocephalic, without obvious abnormality, Pupils reactive & symmetrical B/L.  Lungs:   B/L Breath sounds present with decreased breath sounds on bases, no wheezing heard, no crackles.   Heart: S1 and S2 present, no murmur  Abdomen: Soft, non-tender, no guarding or rigidity, bowel sounds positive.  Extremities:  no edema, warm to touch.  Pulses: Positive and symmetric.  Neurologic:  Moving all four extremities. Good strength bilaterally.   Interval: (shift change)  1a. Level of Consciousness: 0-->Alert, keenly responsive  1b. LOC Questions: 0-->Answers both questions correctly  1c. LOC Commands: 0-->Performs both tasks correctly  2. Best Gaze: 0-->Normal  3. Visual: 0-->No visual loss  4. Facial Palsy: 0-->Normal symmetrical movements  5a. Motor Arm, Left: 0-->No drift, limb holds 90 (or 45) degrees for full 10 secs  5b. Motor Arm, Right: 0-->No drift, limb holds 90 (or 45) degrees for full 10 secs  6a. Motor Leg, Left: 0-->No drift, leg holds 30 degree position for full 5 secs  6b. Motor Leg, Right: 0-->No drift, leg holds 30 degree position for full 5 secs  7. Limb Ataxia: 0-->Absent  8. Sensory: 0-->Normal, no sensory loss  9. Best Language: 0-->No aphasia, normal  10. Dysarthria: 0-->Normal  11. Extinction and Inattention (formerly Neglect): 0-->No abnormality    Total (NIH Stroke Scale): 0        Results from last 7 days   Lab Units 10/19/20  0603 10/18/20  0541 10/17/20  0044   WBC 10*3/mm3 11.65* 11.23* 11.03*   HEMOGLOBIN g/dL 12.0* 11.9* 11.9*   PLATELETS 10*3/mm3 256 223 201     Results from last 7 days   Lab Units 10/20/20  0851 10/20/20  0321 " 10/20/20  0057 10/19/20  2208  10/19/20  0603  10/18/20  0541  10/14/20  1928 10/14/20  0557   SODIUM mmol/L 137 133* 135*  --    < > 136   < > 143   < >  --  141   POTASSIUM mmol/L  --  3.7  --  3.9  --  3.1*  --  4.0   < >  --  4.3   CO2 mmol/L  --  22.0  --   --   --  29.0  --  27.0   < >  --  26.0   BUN mg/dL  --  9  --   --   --  7  --  7   < >  --  9   CREATININE mg/dL  --  0.63*  --   --   --  0.76  --  0.85   < >  --  0.86   MAGNESIUM mg/dL  --  2.1  --   --   --  1.5*  --   --   --   --  2.0   PHOSPHORUS mg/dL  --  2.7  --   --   --  2.8  --   --   --  2.6 2.4*   GLUCOSE mg/dL  --  118*  --   --   --  133*  --  145*   < >  --  96    < > = values in this interval not displayed.     Estimated Creatinine Clearance: 112.7 mL/min (A) (by C-G formula based on SCr of 0.63 mg/dL (L)).          Images:   CT head report reviewed.  FINDINGS: Noncontrast CT of the head demonstrates decreased conspicuity  of subarachnoid blood products no most prominent in the prepontine  cistern. Otherwise no evidence of interval ischemia, hemorrhage, new  mass or mass effect.     CT angiogram of the head demonstrating patent, normal caliber bilateral  intracranial internal carotid arteries with minimal atherosclerosis not  producing significant associated narrowing. Bilateral anterior cerebral  arteries are patent, normal in course and caliber without evidence of  significant stenosis, occlusion or aneurysmal dilatation. Patent, normal  caliber bilateral middle cerebral arteries without evidence of aneurysm  or significant narrowing. Bilateral intradural vertebral arteries are  normal in course and caliber. The vertebrobasilar system is normal,  without evidence of significant stenosis, occlusion or aneurysmal  dilatation.     IMPRESSION:  Unremarkable CT angiogram of the head without evidence of  etiologic aneurysm to account for subarachnoid hemorrhage.        This report was finalized on 10/19/2020 12:35 PM by Johnny  Curtis.    I reviewed the patient's results and images.     Chest x-ray reviewed.  IMPRESSION:  Considerable decrease in opacifications in the left lower  lung of improved airspace disease without new process.     D:  10/20/2020  E:  10/20/2020      Transcranial Dopplers  showed normal mean velocities.    Assessment/Plan   Impression        SAH     GERD    BPH    H/O SDH s/p L frontal craniotomy (7/23/19)    Tobacco use (1.5 PPD)    HTN    Marijuana use       Plan        1.  Patient s/p subarachnoid hemorrhage.  No culprit lesion noted on angiogram.  Currently remain on nimodipine.  On 1.5% saline for possible cerebral salt wasting.  Neurosurgery managing.  No evidence of vasospasm at this point.  Continue nimodipine.  Sodium level improved to 137 currently.  2.  Patient developed infiltrative process in the left lower lobe concerning for nosocomial pneumonia.  And been on vancomycin and aztreonam.  More than likely appears aspiration pneumonia antibiotics changed to aztreonam and Flagyl.  1 out of 2 cultures is now growing staph species and coag negative staph, likely contaminant.  Vancomycin was stopped yesterday.  Chest x-ray showing improvement.  No new fevers.  WBC count is mildly elevated and will be monitored closely.  3.  Continue GI and DVT prophylaxis.  4.  Oral intake improved, continue to monitor..  5.  Replace electrolytes per ICU protocol.    Continue close monitoring in ICU for now.    Plan of care and goals reviewed with mulitdisciplinary/antibiotic stewardship team during rounds.   I discussed the patient's findings and my recommendations with patient     High level of risk due to:  illness with threat to life or bodily function.    Time spent 25 min (exclusive of procedure time)  including high complexity decision making to assess, manipulate, and support vital organ system failure in this individual who has impairment of one or more vital organ systems such that there is a high probability of  imminent or life threatening deterioration in the patient’s condition.      Lupillo Elkins MD, [facility]  Pulmonary, Critical care and Sleep Medicine

## 2020-10-20 NOTE — PROGRESS NOTES
"Clinical Nutrition Note      Patient Name: David Dempsey  MRN: [id number]  Admission date: 10/11/2020      Multidisciplinary Rounds    Additional information obtained during MDR:  RN reports pt still has some HA pain; concerned about pain medications d/t past hx of addiction. Eating a little more. Hypertonic salne 1.5% at 50 ml/hr, Na+ 137.    Current diet: Diet Regular    Oral Nutrition Supplement:     Pertinent medical data reviewed:  No nutrition risk identified on nursing screen; MST score \"0\"    Intervention:  Pt interviewed menu adjusted for dinner; girl friend bring [employer] for lunch.  Intake encouraged.  Plan of care and goals reviewed    Monitor:  RD to follow per protocol      Denae Sheikh MS,RD,LD  10/20/20 14:00 EDT  Time: 15  mins       "

## 2020-10-20 NOTE — PLAN OF CARE
Goal Outcome Evaluation:  Plan of Care Reviewed With: patient  Progress: no change  Outcome Summary: PAtient given PRN pain medicine throughout night. Around 0350 in morning woke up with a headache of a 9. Said it was in the same area but woke him upp from sleep. Gave PRN dilaudid, patient reported down to a 7. Patient seems anxious, admitting that he feels sad and anxious this morning about the pain. Seemed better after staying and talking with the patient. UOP adequate. No neuro changes, NIH was a 0 at shift change and when headache wasn't better. VSS. Sodium check was 1335, awaiting morning check. Breathing treatment ordered PRN for wheezing. No complaints of nausea. Productive, frequent cough. Patient around 0430 continued to complain of pain but then patient stated he felt anxious. Stayed in the room with the patient and he seemed to calm down.

## 2020-10-20 NOTE — PLAN OF CARE
Goal Outcome Evaluation:  Plan of Care Reviewed With: patient, significant other  Progress: no change  Outcome Summary: NIH 0. Neurologically intact. Q6h Na checks. Room air. VSS. Strict I&O. Pt. able to have water now per LUIS Cannon. Appetite increasing. Afebrile. Toradol and percocet prn for pain. Will continue to monitor. Zofran given X 1 for nausea. 1.5% hypertonic saline infusing at 50ml/hr.

## 2020-10-21 ENCOUNTER — TELEPHONE (OUTPATIENT)
Dept: NEUROSURGERY | Facility: CLINIC | Age: 50
End: 2020-10-21

## 2020-10-21 VITALS
OXYGEN SATURATION: 98 % | HEIGHT: 66 IN | WEIGHT: 136.24 LBS | BODY MASS INDEX: 21.9 KG/M2 | SYSTOLIC BLOOD PRESSURE: 132 MMHG | RESPIRATION RATE: 16 BRPM | DIASTOLIC BLOOD PRESSURE: 92 MMHG | TEMPERATURE: 97.2 F | HEART RATE: 85 BPM

## 2020-10-21 DIAGNOSIS — I60.9 SAH (SUBARACHNOID HEMORRHAGE) (HCC): Primary | ICD-10-CM

## 2020-10-21 LAB
ANION GAP SERPL CALCULATED.3IONS-SCNC: 9 MMOL/L (ref 5–15)
BASOPHILS # BLD AUTO: 0.03 10*3/MM3 (ref 0–0.2)
BASOPHILS NFR BLD AUTO: 0.3 % (ref 0–1.5)
BUN SERPL-MCNC: 8 MG/DL (ref 6–20)
BUN/CREAT SERPL: 11.8 (ref 7–25)
CALCIUM SPEC-SCNC: 8.5 MG/DL (ref 8.6–10.5)
CHLORIDE SERPL-SCNC: 101 MMOL/L (ref 98–107)
CO2 SERPL-SCNC: 28 MMOL/L (ref 22–29)
CREAT SERPL-MCNC: 0.68 MG/DL (ref 0.76–1.27)
DEPRECATED RDW RBC AUTO: 46 FL (ref 37–54)
EOSINOPHIL # BLD AUTO: 0.89 10*3/MM3 (ref 0–0.4)
EOSINOPHIL NFR BLD AUTO: 8 % (ref 0.3–6.2)
ERYTHROCYTE [DISTWIDTH] IN BLOOD BY AUTOMATED COUNT: 13.1 % (ref 12.3–15.4)
GFR SERPL CREATININE-BSD FRML MDRD: 122 ML/MIN/1.73
GLUCOSE SERPL-MCNC: 95 MG/DL (ref 65–99)
HCT VFR BLD AUTO: 39.6 % (ref 37.5–51)
HGB BLD-MCNC: 13 G/DL (ref 13–17.7)
IMM GRANULOCYTES # BLD AUTO: 0.06 10*3/MM3 (ref 0–0.05)
IMM GRANULOCYTES NFR BLD AUTO: 0.5 % (ref 0–0.5)
LYMPHOCYTES # BLD AUTO: 1.29 10*3/MM3 (ref 0.7–3.1)
LYMPHOCYTES NFR BLD AUTO: 11.6 % (ref 19.6–45.3)
MAGNESIUM SERPL-MCNC: 1.8 MG/DL (ref 1.6–2.6)
MCH RBC QN AUTO: 31.2 PG (ref 26.6–33)
MCHC RBC AUTO-ENTMCNC: 32.8 G/DL (ref 31.5–35.7)
MCV RBC AUTO: 95 FL (ref 79–97)
MONOCYTES # BLD AUTO: 0.7 10*3/MM3 (ref 0.1–0.9)
MONOCYTES NFR BLD AUTO: 6.3 % (ref 5–12)
NEUTROPHILS NFR BLD AUTO: 73.3 % (ref 42.7–76)
NEUTROPHILS NFR BLD AUTO: 8.12 10*3/MM3 (ref 1.7–7)
NRBC BLD AUTO-RTO: 0 /100 WBC (ref 0–0.2)
PHOSPHATE SERPL-MCNC: 3.7 MG/DL (ref 2.5–4.5)
PLATELET # BLD AUTO: 314 10*3/MM3 (ref 140–450)
PMV BLD AUTO: 9.4 FL (ref 6–12)
POTASSIUM SERPL-SCNC: 4 MMOL/L (ref 3.5–5.2)
RBC # BLD AUTO: 4.17 10*6/MM3 (ref 4.14–5.8)
SODIUM SERPL-SCNC: 137 MMOL/L (ref 136–145)
SODIUM SERPL-SCNC: 138 MMOL/L (ref 136–145)
SODIUM SERPL-SCNC: 138 MMOL/L (ref 136–145)
WBC # BLD AUTO: 11.09 10*3/MM3 (ref 3.4–10.8)

## 2020-10-21 PROCEDURE — 25010000002 KETOROLAC TROMETHAMINE PER 15 MG: Performed by: NEUROLOGICAL SURGERY

## 2020-10-21 PROCEDURE — 25010000003 MAGNESIUM SULFATE 4 GM/100ML SOLUTION: Performed by: PHYSICIAN ASSISTANT

## 2020-10-21 PROCEDURE — 25010000002 PROMETHAZINE PER 50 MG: Performed by: PHYSICIAN ASSISTANT

## 2020-10-21 PROCEDURE — 80048 BASIC METABOLIC PNL TOTAL CA: CPT | Performed by: INTERNAL MEDICINE

## 2020-10-21 PROCEDURE — 99238 HOSP IP/OBS DSCHRG MGMT 30/<: CPT | Performed by: PHYSICIAN ASSISTANT

## 2020-10-21 PROCEDURE — 25010000002 HEPARIN (PORCINE) PER 1000 UNITS: Performed by: PHYSICIAN ASSISTANT

## 2020-10-21 PROCEDURE — 83735 ASSAY OF MAGNESIUM: CPT | Performed by: INTERNAL MEDICINE

## 2020-10-21 PROCEDURE — 84295 ASSAY OF SERUM SODIUM: CPT | Performed by: PHYSICIAN ASSISTANT

## 2020-10-21 PROCEDURE — 85025 COMPLETE CBC W/AUTO DIFF WBC: CPT | Performed by: INTERNAL MEDICINE

## 2020-10-21 PROCEDURE — 84100 ASSAY OF PHOSPHORUS: CPT | Performed by: INTERNAL MEDICINE

## 2020-10-21 PROCEDURE — 25010000002 ONDANSETRON PER 1 MG: Performed by: PHYSICIAN ASSISTANT

## 2020-10-21 PROCEDURE — 99232 SBSQ HOSP IP/OBS MODERATE 35: CPT | Performed by: INTERNAL MEDICINE

## 2020-10-21 RX ORDER — NIMODIPINE 30 MG/1
60 CAPSULE, LIQUID FILLED ORAL
Qty: 132 CAPSULE | Refills: 0 | Status: SHIPPED | OUTPATIENT
Start: 2020-10-21 | End: 2020-11-01

## 2020-10-21 RX ORDER — CEFUROXIME AXETIL 500 MG/1
500 TABLET ORAL EVERY 12 HOURS SCHEDULED
Qty: 14 TABLET | Refills: 0 | Status: SHIPPED | OUTPATIENT
Start: 2020-10-21 | End: 2020-10-28

## 2020-10-21 RX ORDER — OXYCODONE HYDROCHLORIDE AND ACETAMINOPHEN 5; 325 MG/1; MG/1
1 TABLET ORAL EVERY 8 HOURS PRN
Qty: 30 TABLET | Refills: 0 | Status: SHIPPED | OUTPATIENT
Start: 2020-10-21

## 2020-10-21 RX ORDER — CEFUROXIME AXETIL 250 MG/1
500 TABLET ORAL EVERY 12 HOURS SCHEDULED
Status: DISCONTINUED | OUTPATIENT
Start: 2020-10-21 | End: 2020-10-21 | Stop reason: HOSPADM

## 2020-10-21 RX ORDER — DOCUSATE SODIUM 100 MG/1
100 CAPSULE, LIQUID FILLED ORAL 2 TIMES DAILY PRN
Qty: 30 CAPSULE | Refills: 1 | Status: SHIPPED | OUTPATIENT
Start: 2020-10-21

## 2020-10-21 RX ORDER — TAMSULOSIN HYDROCHLORIDE 0.4 MG/1
0.4 CAPSULE ORAL DAILY PRN
Qty: 15 CAPSULE | Refills: 0 | Status: SHIPPED | OUTPATIENT
Start: 2020-10-21

## 2020-10-21 RX ORDER — METRONIDAZOLE 500 MG/1
500 TABLET ORAL EVERY 8 HOURS SCHEDULED
Qty: 15 TABLET | Refills: 0 | Status: SHIPPED | OUTPATIENT
Start: 2020-10-21 | End: 2020-10-26

## 2020-10-21 RX ORDER — METRONIDAZOLE 500 MG/1
500 TABLET ORAL EVERY 8 HOURS SCHEDULED
Status: DISCONTINUED | OUTPATIENT
Start: 2020-10-21 | End: 2020-10-21 | Stop reason: HOSPADM

## 2020-10-21 RX ORDER — ONDANSETRON 4 MG/1
4 TABLET, FILM COATED ORAL EVERY 8 HOURS PRN
Qty: 45 TABLET | Refills: 1 | Status: SHIPPED | OUTPATIENT
Start: 2020-10-21

## 2020-10-21 RX ADMIN — HEPARIN SODIUM 5000 UNITS: 5000 INJECTION, SOLUTION INTRAVENOUS; SUBCUTANEOUS at 14:27

## 2020-10-21 RX ADMIN — METRONIDAZOLE 500 MG: 500 INJECTION, SOLUTION INTRAVENOUS at 02:20

## 2020-10-21 RX ADMIN — KETOROLAC TROMETHAMINE 30 MG: 30 INJECTION, SOLUTION INTRAMUSCULAR at 02:20

## 2020-10-21 RX ADMIN — AZTREONAM 2 G: 2 INJECTION, POWDER, LYOPHILIZED, FOR SOLUTION INTRAMUSCULAR; INTRAVENOUS at 00:02

## 2020-10-21 RX ADMIN — HEPARIN SODIUM 5000 UNITS: 5000 INJECTION, SOLUTION INTRAVENOUS; SUBCUTANEOUS at 06:23

## 2020-10-21 RX ADMIN — TAMSULOSIN HYDROCHLORIDE 0.4 MG: 0.4 CAPSULE ORAL at 08:22

## 2020-10-21 RX ADMIN — NIMODIPINE 60 MG: 30 CAPSULE, LIQUID FILLED ORAL at 12:29

## 2020-10-21 RX ADMIN — AZTREONAM 2 G: 2 INJECTION, POWDER, LYOPHILIZED, FOR SOLUTION INTRAMUSCULAR; INTRAVENOUS at 08:22

## 2020-10-21 RX ADMIN — METRONIDAZOLE 500 MG: 500 TABLET ORAL at 15:52

## 2020-10-21 RX ADMIN — OXYCODONE HYDROCHLORIDE AND ACETAMINOPHEN 1 TABLET: 7.5; 325 TABLET ORAL at 04:01

## 2020-10-21 RX ADMIN — PROMETHAZINE HYDROCHLORIDE 12.5 MG: 25 INJECTION, SOLUTION INTRAMUSCULAR; INTRAVENOUS at 09:16

## 2020-10-21 RX ADMIN — METRONIDAZOLE 500 MG: 500 INJECTION, SOLUTION INTRAVENOUS at 09:33

## 2020-10-21 RX ADMIN — OXYCODONE HYDROCHLORIDE AND ACETAMINOPHEN 1 TABLET: 7.5; 325 TABLET ORAL at 00:07

## 2020-10-21 RX ADMIN — ONDANSETRON HYDROCHLORIDE 4 MG: 2 SOLUTION INTRAMUSCULAR; INTRAVENOUS at 06:26

## 2020-10-21 RX ADMIN — SODIUM CHLORIDE, PRESERVATIVE FREE 10 ML: 5 INJECTION INTRAVENOUS at 08:23

## 2020-10-21 RX ADMIN — NIMODIPINE 60 MG: 30 CAPSULE, LIQUID FILLED ORAL at 08:22

## 2020-10-21 RX ADMIN — NIMODIPINE 60 MG: 30 CAPSULE, LIQUID FILLED ORAL at 00:00

## 2020-10-21 RX ADMIN — NIMODIPINE 60 MG: 30 CAPSULE, LIQUID FILLED ORAL at 04:01

## 2020-10-21 RX ADMIN — FAMOTIDINE 20 MG: 20 TABLET ORAL at 08:22

## 2020-10-21 RX ADMIN — KETOROLAC TROMETHAMINE 30 MG: 30 INJECTION, SOLUTION INTRAMUSCULAR at 08:22

## 2020-10-21 RX ADMIN — OXYCODONE HYDROCHLORIDE AND ACETAMINOPHEN 1 TABLET: 7.5; 325 TABLET ORAL at 12:30

## 2020-10-21 RX ADMIN — ACETAMINOPHEN 1000 MG: 500 TABLET, FILM COATED ORAL at 06:26

## 2020-10-21 RX ADMIN — NIMODIPINE 60 MG: 30 CAPSULE, LIQUID FILLED ORAL at 15:52

## 2020-10-21 RX ADMIN — OXYCODONE HYDROCHLORIDE AND ACETAMINOPHEN 1 TABLET: 7.5; 325 TABLET ORAL at 08:22

## 2020-10-21 RX ADMIN — MAGNESIUM SULFATE HEPTAHYDRATE 4 G: 40 INJECTION, SOLUTION INTRAVENOUS at 08:22

## 2020-10-21 NOTE — PROGRESS NOTES
Subjective: No events overnight    Objective:    Vitals:    10/21/20 0822   BP: 147/94   Pulse:    Resp:    Temp:    SpO2:      Pulse  Av.1  Min: 55  Max: 82  Systolic (24hrs), Av , Min:119 , Max:150     Diastolic (24hrs), Av, Min:72, Max:104    Temp (24hrs), Av.3 °F (36.8 °C), Min:98 °F (36.7 °C), Max:98.5 °F (36.9 °C)      He is awake, alert, conversant, and appropriate.  Speech production is fluent with no paraphasic errors.  Cranial nerves II through XII are intact, and he has no pronator drift.    Lab Results   Component Value Date     10/21/2020       A/P:   DC hypertonic saline  Anticipate discharge home tomorrow.  He will need a repeat CTA in 6 weeks once he is been discharged.  He is asking about a work release, and I think it is reasonable for him to take the next 2-4 weeks off given the degree of headache he is currently experiencing.

## 2020-10-21 NOTE — NURSING NOTE
Prior to central line removal, order for the removal of catheter was verified, patient was assessed, necessary materials were gathered and patient was educated regarding procedure .    Patient was positioned flat to ensure that the insertion site was at or below the level of the heart.    Hands were washed, clean gloves were applied and central line dressing was gently removed. Catheter exit site was not cultured.     A new pair of clean gloves were then applied. Insertion site was cleansed with 2% Chlorhexidine swab using a circular motion beginning at the insertion site and moving outward for 30 seconds and allowed to dry.     Clamp on line was not present.     Patient was instructed to hold breath as catheter was withdrawn.     The central line was grasped at the insertion site and slowly pulled outward parallel to the skin. Resistance was not met.    After central line was completely removed, a sterile 4x4 gauze pad was used to apply light pressure until bleeding stopped. At that time, petroleum-based gauze and a sterile occlusive dressing was applied to exit site.     Patient was instructed to keep dressing in place for at least 24 hours and to remain in a flat or reclining position for 30 minutes post-removal.     Catheter was inspected after removal and was intact. Tip of central line was not sent for culture. Patient tolerated procedure.

## 2020-10-21 NOTE — PROGRESS NOTES
"Intensive Care Follow-up     Hospital:  LOS: 10 days   Mr. David Dempsey, 50 y.o. male is followed for:   SAH (subarachnoid hemorrhage) (CMS/[facility])            History of present illness:     50-year-old male who developed the sudden onset of a severe headache with nausea and photophobia on 10/11.  He was transferred to our hospital after a CT scan demonstrated a subarachnoid hemorrhage.  He underwent diagnostic angiogram on 10/12 which revealed no culprit lesion for his bleed.  He has a history of a prior left frontal craniotomy in July 2019 for subdural hematoma.  Other medical problems are hypertension, BPH, GERD, and tobacco use.  Diagnostic angiogram on 10/12/2020 revealed no culprit lesions.  Has remained in ICU and being treated for vasospasm and suspected cerebral salt wasting.    Subjective   Interval History:  Overnight no acute events.  Still complains of pain.  Currently off IV medications.  Hypertonic saline has been discontinued.               The patient's past medical, surgical and social history were reviewed and updated in Epic as appropriate.       Objective     Infusions:     Medications:  aztreonam, 2 g, Intravenous, Q8H  famotidine, 20 mg, Oral, Q12H  heparin (porcine), 5,000 Units, Subcutaneous, Q8H  metroNIDAZOLE, 500 mg, Intravenous, Q8H  niMODipine, 60 mg, Oral, Q4H  sodium chloride, 10 mL, Intravenous, Q12H  tamsulosin, 0.4 mg, Oral, Daily        Vital Sign Min/Max for last 24 hours  Temp  Min: 97.2 °F (36.2 °C)  Max: 98.3 °F (36.8 °C)   BP  Min: 119/104  Max: 150/93   Pulse  Min: 55  Max: 80   Resp  Min: 16  Max: 20   SpO2  Min: 85 %  Max: 95 %   Flow (L/min)  Min: 4  Max: 4       Input/Output for last 24 hour shift  10/20 0701 - 10/21 0700  In: 4331.1 [P.O.:2364; I.V.:1467.1]  Out: 5425 [Urine:5425]      Objective:  Vital signs: (most recent): Blood pressure 142/90, pulse 60, temperature 97.2 °F (36.2 °C), temperature source Oral, resp. rate 16, height 167.6 cm (66\"), weight 61.8 kg (136 lb " 3.9 oz), SpO2 93 %.               General Appearance: Awake, alert, in no acute distress  Head:    Atraumatic, Normocephalic, without obvious abnormality, Pupils reactive & symmetrical B/L.  Lungs:   B/L Breath sounds present with decreased breath sounds on bases, no wheezing heard, no crackles.   Heart: S1 and S2 present, no murmur  Abdomen: Soft, non-tender, no guarding or rigidity, bowel sounds positive.  Extremities:  no edema, warm to touch.  Neurologic:  Moving all four extremities. Good strength bilaterally.   Interval: (handoff)  1a. Level of Consciousness: 0-->Alert, keenly responsive  1b. LOC Questions: 0-->Answers both questions correctly  1c. LOC Commands: 0-->Performs both tasks correctly  2. Best Gaze: 0-->Normal  3. Visual: 0-->No visual loss  4. Facial Palsy: 0-->Normal symmetrical movements  5a. Motor Arm, Left: 0-->No drift, limb holds 90 (or 45) degrees for full 10 secs  5b. Motor Arm, Right: 0-->No drift, limb holds 90 (or 45) degrees for full 10 secs  6a. Motor Leg, Left: 0-->No drift, leg holds 30 degree position for full 5 secs  6b. Motor Leg, Right: 0-->No drift, leg holds 30 degree position for full 5 secs  7. Limb Ataxia: 0-->Absent  8. Sensory: 0-->Normal, no sensory loss  9. Best Language: 0-->No aphasia, normal  10. Dysarthria: 0-->Normal  11. Extinction and Inattention (formerly Neglect): 0-->No abnormality    Total (NIH Stroke Scale): 0        Results from last 7 days   Lab Units 10/21/20  0612 10/19/20  0603 10/18/20  0541   WBC 10*3/mm3 11.09* 11.65* 11.23*   HEMOGLOBIN g/dL 13.0 12.0* 11.9*   PLATELETS 10*3/mm3 314 256 223     Results from last 7 days   Lab Units 10/21/20  1205 10/21/20  0612 10/21/20  0002  10/20/20  0321  10/19/20  2208  10/19/20  0603   SODIUM mmol/L 137 138 138   < > 133*   < >  --    < > 136   POTASSIUM mmol/L  --  4.0  --   --  3.7  --  3.9  --  3.1*   CO2 mmol/L  --  28.0  --   --  22.0  --   --   --  29.0   BUN mg/dL  --  8  --   --  9  --   --   --  7    CREATININE mg/dL  --  0.68*  --   --  0.63*  --   --   --  0.76   MAGNESIUM mg/dL  --  1.8  --   --  2.1  --   --   --  1.5*   PHOSPHORUS mg/dL  --  3.7  --   --  2.7  --   --   --  2.8   GLUCOSE mg/dL  --  95  --   --  118*  --   --   --  133*    < > = values in this interval not displayed.     Estimated Creatinine Clearance: 111.1 mL/min (A) (by C-G formula based on SCr of 0.68 mg/dL (L)).          Images:   CT head report reviewed.  FINDINGS: Noncontrast CT of the head demonstrates decreased conspicuity  of subarachnoid blood products no most prominent in the prepontine  cistern. Otherwise no evidence of interval ischemia, hemorrhage, new  mass or mass effect.     CT angiogram of the head demonstrating patent, normal caliber bilateral  intracranial internal carotid arteries with minimal atherosclerosis not  producing significant associated narrowing. Bilateral anterior cerebral  arteries are patent, normal in course and caliber without evidence of  significant stenosis, occlusion or aneurysmal dilatation. Patent, normal  caliber bilateral middle cerebral arteries without evidence of aneurysm  or significant narrowing. Bilateral intradural vertebral arteries are  normal in course and caliber. The vertebrobasilar system is normal,  without evidence of significant stenosis, occlusion or aneurysmal  dilatation.     IMPRESSION:  Unremarkable CT angiogram of the head without evidence of  etiologic aneurysm to account for subarachnoid hemorrhage.        This report was finalized on 10/19/2020 12:35 PM by Johnny Acevedo.    I reviewed the patient's results and images.       Transcranial Dopplers  showed normal mean velocities yesterday.    Assessment/Plan   Impression        SAH     GERD    BPH    H/O SDH s/p L frontal craniotomy (7/23/19)    Tobacco use (1.5 PPD)    HTN    Marijuana use       Plan        1.  Patient s/p subarachnoid hemorrhage.  No culprit lesion noted on angiogram.  Currently remain on nimodipine.   Sodium level stable and hypertonic saline started per neurosurgery.  2.  Patient developed infiltrative process in the left lower lobe concerning for nosocomial pneumonia.  Blood culture 1 out of 2 staph epi likely contaminant.  Being treated with aztreonam and Flagyl for possible aspiration pneumonia.  If plan is to discharge him tomorrow, plan regimen for couple of days which he can take orally.  He is allergic to penicillin and will inquire more about that.  We will go out on cephalosporin oral.    3.  Continue GI and DVT prophylaxis.  4.  Oral intake improved, continue to monitor..  5.  Monitor and replace electrolytes per ICU protocol.    Continue close monitoring in ICU for now.    Plan of care and goals reviewed with mulitdisciplinary/antibiotic stewardship team during rounds.   I discussed the patient's findings and my recommendations with patient     High level of risk due to:  illness with threat to life or bodily function.    Time spent 25 min (exclusive of procedure time)  including high complexity decision making to assess, manipulate, and support vital organ system failure in this individual who has impairment of one or more vital organ systems such that there is a high probability of imminent or life threatening deterioration in the patient’s condition.      Lupillo Elkins MD, [facility]  Pulmonary, Critical care and Sleep Medicine

## 2020-10-21 NOTE — PLAN OF CARE
Problem: Adult Inpatient Plan of Care  Goal: Plan of Care Review  Outcome: Ongoing, Progressing  Flowsheets (Taken 10/21/2020 0337)  Progress: improving  Plan of Care Reviewed With: patient  Outcome Summary: NIH 0, Pt Neurologically intact, see flow sheet for details. Continues on 1.5% hypertonic Saline at 50 ml/hr with Na levels q6 hrs, lwtting them normalize, last one was 138 at midnight. Pt now taking po pain meds and IV torodal and states great relief with this comboof agents. UOP more than adequate, strict I & O's being followed. Continue to monitor, and this pt states he anticipates discharge home possibly on Thursday.

## 2020-10-21 NOTE — PROGRESS NOTES
Continued Stay Note  [facility]     Patient Name: David Dempsey  MRN: [id number]  Today's Date: 10/21/2020    Admit Date: 10/11/2020    Discharge Plan     Row Name 10/21/20 1300       Plan    Plan  home    Plan Comments  Per Neurosurgery notes patient may be discharged tomorrow.  Plan is still home with Significant other.  CM following    Final Discharge Disposition Code  01 - home or self-care        Discharge Codes    No documentation.       Expected Discharge Date and Time     Expected Discharge Date Expected Discharge Time    Oct 24, 2020             Elaine Ta RN

## 2020-10-21 NOTE — NURSING NOTE
Patient requested to go home with anticipated discharge for tomorrow. LUIS Cannon paged and placed discharge orders for patient to go home today.

## 2020-10-22 ENCOUNTER — READMISSION MANAGEMENT (OUTPATIENT)
Dept: CALL CENTER | Facility: HOSPITAL | Age: 50
End: 2020-10-22

## 2020-10-22 LAB
BACTERIA SPEC AEROBE CULT: NORMAL
BACTERIA SPEC AEROBE CULT: NORMAL

## 2020-10-22 NOTE — OUTREACH NOTE
Prep Survey      Responses   [facility] facility patient discharged from?  [place]   Is LACE score < 7 ?  No   Eligibility  Readm Mgmt   Discharge diagnosis  SAH,  HTN   Does the patient have one of the following disease processes/diagnoses(primary or secondary)?  Other   Does the patient have Home health ordered?  No   Is there a DME ordered?  No   Comments regarding appointments  see AVS   Medication alerts for this patient  see AVS for changes   Prep survey completed?  Yes          Chayito Liriano RN

## 2020-10-23 LAB — BACTERIA SPEC AEROBE CULT: NORMAL

## 2020-10-27 ENCOUNTER — READMISSION MANAGEMENT (OUTPATIENT)
Dept: CALL CENTER | Facility: HOSPITAL | Age: 50
End: 2020-10-27

## 2020-10-27 ENCOUNTER — NURSE TRIAGE (OUTPATIENT)
Dept: CALL CENTER | Facility: HOSPITAL | Age: 50
End: 2020-10-27

## 2020-10-27 NOTE — TELEPHONE ENCOUNTER
"Caller is asking how to get a refill of the Percocet that was ordered for patient post surgery.  Advised to call the physician that had ordered the medication.      Reason for Disposition  • Caller requesting a CONTROLLED substance prescription refill (e.g., narcotics, ADHD medicines)    Additional Information  • Negative: Drug overdose and triager unable to answer question  • Negative: Caller requesting information unrelated to medicine  • Negative: Caller requesting a prescription for Strep throat and has a positive culture result  • Negative: Rash while taking a medication or within 3 days of stopping it  • Negative: Immunization reaction suspected  • Negative: [1] Asthma and [2] having symptoms of asthma (cough, wheezing, etc.)  • Negative: [1] Influenza symptoms AND [2] anti-viral med prescription request, such as Tamiflu  • Negative: [1] Symptom of illness (e.g., headache, abdominal pain, earache, vomiting) AND [2] more than mild  • Negative: MORE THAN A DOUBLE DOSE of a prescription or over-the-counter (OTC) drug  • Negative: [1] DOUBLE DOSE (an extra dose or lesser amount) of over-the-counter (OTC) drug AND [2] any symptoms (e.g., dizziness, nausea, pain, sleepiness)  • Negative: [1] DOUBLE DOSE (an extra dose or lesser amount) of prescription drug AND [2] any symptoms (e.g., dizziness, nausea, pain, sleepiness)  • Negative: Took another person's prescription drug  • Negative: [1] DOUBLE DOSE (an extra dose or lesser amount) of prescription drug AND [2] NO symptoms (Exception: a double dose of antibiotics)  • Negative: Diabetes drug error or overdose (e.g., took wrong type of insulin or took extra dose)  • Negative: [1] Request for URGENT new prescription or refill of \"essential\" medication (i.e., likelihood of harm to patient if not taken) AND [2] triager unable to fill per unit policy  • Negative: [1] Prescription not at pharmacy AND [2] was prescribed by PCP recently  • Negative: [1] Pharmacy calling with " "prescription questions AND [2] triager unable to answer question  • Negative: [1] Caller has URGENT medication question about med that PCP or specialist prescribed AND [2] triager unable to answer question  • Negative: [1] Caller has NON-URGENT medication question about med that PCP prescribed AND [2] triager unable to answer question  • Negative: [1] Caller requesting a NON-URGENT new prescription or refill AND [2] triager unable to refill per unit policy  • Negative: [1] Caller has medication question about med not prescribed by PCP AND [2] triager unable to answer question (e.g., compatibility with other med, storage)  • Negative: Caller wants to use a complementary or alternative medicine  • Negative: [1] Prescription prescribed recently is not at pharmacy AND [2] triager has access to patient's EMR AND [3] prescription is recorded in the EMR  • Negative: [1] DOUBLE DOSE (an extra dose or lesser amount) of over-the-counter (OTC) drug AND [2] NO symptoms  • Negative: [1] DOUBLE DOSE (an extra dose or lesser amount) of antibiotic drug AND [2] NO symptoms  • Negative: Caller has medication question only, adult not sick, and triager answers question  • Negative: Caller has medication question, adult has minor symptoms, caller declines triage, AND triager answers question  • Negative: Caller requesting information about medication during pregnancy; adult is not ill AND triager answers question    Answer Assessment - Initial Assessment Questions  1.   NAME of MEDICATION: \"What medicine are you calling about?\"      Percocet  2.   QUESTION: \"What is your question?\"      Caller is requesting a refill.    3.   PRESCRIBING HCP: \"Who prescribed it?\" Reason: if prescribed by specialist, call should be referred to that group.      Neurosurgical   4. SYMPTOMS: \"Do you have any symptoms?\"      Headache.    5. SEVERITY: If symptoms are present, ask \"Are they mild, moderate or severe?\"      Mild-postoperative.    6.  PREGNANCY:  " "\"Is there any chance that you are pregnant?\" \"When was your last menstrual period?\"      Male    Protocols used: MEDICATION QUESTION CALL-ADULT-      "

## 2020-10-27 NOTE — OUTREACH NOTE
Medical Week 1 Survey      Responses   [facility] patient discharged from?  [place]   Does the patient have one of the following disease processes/diagnoses(primary or secondary)?  Other   Week 1 attempt successful?  No   Unsuccessful attempts  Attempt 1          Therese Márquez RN

## 2020-10-28 ENCOUNTER — TELEPHONE (OUTPATIENT)
Dept: NEUROSURGERY | Facility: CLINIC | Age: 50
End: 2020-10-28

## 2020-10-28 NOTE — TELEPHONE ENCOUNTER
We did an angiogram.  We do not give pain medicine for angiograms.  Patient should contact PCP for further pain management

## 2020-10-28 NOTE — TELEPHONE ENCOUNTER
Provider:  Esequiel  Caller:  Automated refill request  Surgery:  Angio  Surgery Date:  10/12/20  Last visit: procedure only   Next visit: 12/20/20    Reason for call:         Requested Prescriptions     Pending Prescriptions Disp Refills   • oxyCODONE-acetaminophen (PERCOCET) 5-325 MG per tablet 30 tablet 0     Sig: Take 1 tablet by mouth Every 8 (Eight) Hours As Needed for Moderate Pain or Severe Pain .     Juan:     10/21/2020 Oxycodone/Acetaminophen  325MG/5MG  1970 30 [address] JORGE ALBERTO WILHELM [facility]  Retail Pharmacy  [facility] 23 1

## 2020-10-28 NOTE — TELEPHONE ENCOUNTER
Caller: STEPHANIE    Relationship: [facility]  Best call back number: [phone]    Medication needed:   Requested Prescriptions      No prescriptions requested or ordered in this encounter       When do you need the refill by: ASAP    What details did the patient provide when requesting the medication: PT IS OUT OF THE MEDICATION AND WAS SEEN BY DR RIVERA IN THE HOSPITAL.     Does the patient have less than a 3 day supply:  [x] Yes  [] No    What is the patient's preferred pharmacy: [facility] [phone]

## 2020-10-29 ENCOUNTER — READMISSION MANAGEMENT (OUTPATIENT)
Dept: CALL CENTER | Facility: HOSPITAL | Age: 50
End: 2020-10-29

## 2020-10-29 RX ORDER — OXYCODONE HYDROCHLORIDE AND ACETAMINOPHEN 5; 325 MG/1; MG/1
1 TABLET ORAL EVERY 8 HOURS PRN
Qty: 30 TABLET | Refills: 0 | OUTPATIENT
Start: 2020-10-29

## 2020-10-29 NOTE — OUTREACH NOTE
Medical Week 1 Survey      Responses   [facility] patient discharged from?  [place]   Does the patient have one of the following disease processes/diagnoses(primary or secondary)?  Other   Week 1 attempt successful?  Yes   Call start time  1519   Call end time  1523   Discharge diagnosis  SAH,  HTN   Meds reviewed with patient/caregiver?  Yes   Is the patient having any side effects they believe may be caused by any medication additions or changes?  No   Does the patient have all medications ordered at discharge?  Yes   Prescription comments  Pt needs more pain meds   Is the patient taking all medications as directed (includes completed medication regime)?  Yes   Comments regarding appointments  Appt with Dr. Iraheta on 12/2/20   Does the patient have a primary care provider?   Yes   Does the patient have an appointment with their PCP within 7 days of discharge?  Yes   Comments regarding PCP  10/28/20   Has the patient kept scheduled appointments due by today?  N/A   Psychosocial issues?  No   Did the patient receive a copy of their discharge instructions?  Yes   Nursing interventions  Reviewed instructions with patient   What is the patient's perception of their health status since discharge?  Improving   Is the patient/caregiver able to teach back signs and symptoms related to disease process for when to call PCP?  Yes   Is the patient/caregiver able to teach back signs and symptoms related to disease process for when to call 911?  Yes   Is the patient/caregiver able to teach back the hierarchy of who to call/visit for symptoms/problems? PCP, Specialist, Home health nurse, Urgent Care, ED, 911  Yes   If the patient is a current smoker, are they able to teach back resources for cessation?  Smoking cessation medications [Pt quit smoking with hospital admission on 10/11/20]   Week 1 call completed?  Yes          Katerin Santillan RN

## 2020-10-29 NOTE — TELEPHONE ENCOUNTER
A unidentified lady called and requested a call back regarding refill on pain medication for patient.    I called back and rec'd a v/m.  I left a message stating the previous message from yesterday.    Please refuse pended medication so this can be closed.  TY

## 2020-11-06 ENCOUNTER — READMISSION MANAGEMENT (OUTPATIENT)
Dept: CALL CENTER | Facility: HOSPITAL | Age: 50
End: 2020-11-06

## 2020-11-06 NOTE — OUTREACH NOTE
Medical Week 2 Survey      Responses   [facility] patient discharged from?  [place]   Does the patient have one of the following disease processes/diagnoses(primary or secondary)?  Other   Week 2 attempt successful?  Yes   Call start time  1111   Call end time  1117   Is patient permission given to speak with other caregiver?  No   Meds reviewed with patient/caregiver?  Yes   Is the patient taking all medications as directed (includes completed medication regime)?  Yes   Comments regarding appointments  Neurology appointment for December 2, 2020.   Has home health visited the patient within 72 hours of discharge?  N/A   What is the patient's perception of their health status since discharge?  Improving   Week 2 Call Completed?  Yes   Wrap up additional comments  Pt reports continued h/a but is taking aleve. Pt reports he will go back to work next week.          Lakeshia Nelson RN

## 2020-11-16 ENCOUNTER — READMISSION MANAGEMENT (OUTPATIENT)
Dept: CALL CENTER | Facility: HOSPITAL | Age: 50
End: 2020-11-16

## 2020-11-16 NOTE — OUTREACH NOTE
Medical Week 3 Survey      Responses   [facility] patient discharged from?  [place]   Does the patient have one of the following disease processes/diagnoses(primary or secondary)?  Other   Week 3 attempt successful?  Yes   Call start time  1427   Rescheduled  Revoked   Revoke  Decline to participate   Discharge diagnosis  SAH,  HTN          Sarah Engel, RN

## 2020-12-02 ENCOUNTER — HOSPITAL ENCOUNTER (OUTPATIENT)
Dept: CT IMAGING | Facility: HOSPITAL | Age: 50
End: 2020-12-02
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
## (undated) DEVICE — ANGIO-SEAL VIP VASCULAR CLOSURE DEVICE: Brand: ANGIO-SEAL

## (undated) DEVICE — ST EXT IV SMARTSITE 2VLV SP M LL 5ML IV1

## (undated) DEVICE — ROTATING HEMOSTATIC VALVE .096": Brand: RHV

## (undated) DEVICE — ST ACC MICROPUNCTURE .018 TRANSLSS/SS/TP 5F/10CM 21G/7CM

## (undated) DEVICE — SKIN PREP TRAY W/CHG: Brand: MEDLINE INDUSTRIES, INC.

## (undated) DEVICE — CATH TEMPO 5F BER 100CM: Brand: TEMPO

## (undated) DEVICE — INTRO SHEATH ART/FEM ENGAGE .038 5F12CM

## (undated) DEVICE — RADIFOCUS GLIDEWIRE: Brand: GLIDEWIRE

## (undated) DEVICE — LEX NEURO ANGIOGRAPHY: Brand: MEDLINE INDUSTRIES, INC.

## (undated) DEVICE — STPCK 3/WY HP M/RA W/OFF/HNDL 1050PSI STRL

## (undated) DEVICE — LIMB HOLDER, WRIST/ANKLE: Brand: DEROYAL

## (undated) DEVICE — ST INF PRI SMRTSTE 20DRP 2VLV 24ML 117